# Patient Record
Sex: FEMALE | Race: WHITE | NOT HISPANIC OR LATINO | Employment: FULL TIME | URBAN - METROPOLITAN AREA
[De-identification: names, ages, dates, MRNs, and addresses within clinical notes are randomized per-mention and may not be internally consistent; named-entity substitution may affect disease eponyms.]

---

## 2017-04-06 ENCOUNTER — TRANSCRIBE ORDERS (OUTPATIENT)
Dept: LAB | Facility: CLINIC | Age: 22
End: 2017-04-06

## 2017-04-06 ENCOUNTER — APPOINTMENT (OUTPATIENT)
Dept: LAB | Facility: CLINIC | Age: 22
End: 2017-04-06
Payer: COMMERCIAL

## 2017-04-06 DIAGNOSIS — F31.81 BIPOLAR II DISORDER (HCC): ICD-10-CM

## 2017-04-06 DIAGNOSIS — F31.81 BIPOLAR II DISORDER (HCC): Primary | ICD-10-CM

## 2017-04-06 LAB
25(OH)D3 SERPL-MCNC: 13 NG/ML (ref 30–100)
ALBUMIN SERPL BCP-MCNC: 4 G/DL (ref 3.5–5)
ALP SERPL-CCNC: 77 U/L (ref 46–116)
ALT SERPL W P-5'-P-CCNC: 21 U/L (ref 12–78)
ANION GAP SERPL CALCULATED.3IONS-SCNC: 5 MMOL/L (ref 4–13)
AST SERPL W P-5'-P-CCNC: 7 U/L (ref 5–45)
BASOPHILS # BLD AUTO: 0.01 THOUSANDS/ΜL (ref 0–0.1)
BASOPHILS NFR BLD AUTO: 0 % (ref 0–1)
BILIRUB SERPL-MCNC: 0.33 MG/DL (ref 0.2–1)
BUN SERPL-MCNC: 15 MG/DL (ref 5–25)
CALCIUM SERPL-MCNC: 9 MG/DL (ref 8.3–10.1)
CHLORIDE SERPL-SCNC: 108 MMOL/L (ref 100–108)
CO2 SERPL-SCNC: 26 MMOL/L (ref 21–32)
CREAT SERPL-MCNC: 0.71 MG/DL (ref 0.6–1.3)
EOSINOPHIL # BLD AUTO: 0.16 THOUSAND/ΜL (ref 0–0.61)
EOSINOPHIL NFR BLD AUTO: 3 % (ref 0–6)
ERYTHROCYTE [DISTWIDTH] IN BLOOD BY AUTOMATED COUNT: 13.3 % (ref 11.6–15.1)
GFR SERPL CREATININE-BSD FRML MDRD: >60 ML/MIN/1.73SQ M
GLUCOSE P FAST SERPL-MCNC: 89 MG/DL (ref 65–99)
HCT VFR BLD AUTO: 37.7 % (ref 34.8–46.1)
HGB BLD-MCNC: 12.4 G/DL (ref 11.5–15.4)
LYMPHOCYTES # BLD AUTO: 2.18 THOUSANDS/ΜL (ref 0.6–4.47)
LYMPHOCYTES NFR BLD AUTO: 36 % (ref 14–44)
MCH RBC QN AUTO: 30 PG (ref 26.8–34.3)
MCHC RBC AUTO-ENTMCNC: 32.9 G/DL (ref 31.4–37.4)
MCV RBC AUTO: 91 FL (ref 82–98)
MONOCYTES # BLD AUTO: 0.33 THOUSAND/ΜL (ref 0.17–1.22)
MONOCYTES NFR BLD AUTO: 6 % (ref 4–12)
NEUTROPHILS # BLD AUTO: 3.36 THOUSANDS/ΜL (ref 1.85–7.62)
NEUTS SEG NFR BLD AUTO: 55 % (ref 43–75)
NRBC BLD AUTO-RTO: 0 /100 WBCS
PLATELET # BLD AUTO: 233 THOUSANDS/UL (ref 149–390)
PMV BLD AUTO: 10.9 FL (ref 8.9–12.7)
POTASSIUM SERPL-SCNC: 4.3 MMOL/L (ref 3.5–5.3)
PROT SERPL-MCNC: 6.9 G/DL (ref 6.4–8.2)
RBC # BLD AUTO: 4.13 MILLION/UL (ref 3.81–5.12)
SODIUM SERPL-SCNC: 139 MMOL/L (ref 136–145)
T3FREE SERPL-MCNC: 2.83 PG/ML (ref 2.91–4.53)
T4 FREE SERPL-MCNC: 0.95 NG/DL (ref 0.78–1.33)
TSH SERPL DL<=0.05 MIU/L-ACNC: 0.9 UIU/ML (ref 0.46–3.98)
WBC # BLD AUTO: 6.05 THOUSAND/UL (ref 4.31–10.16)

## 2017-04-06 PROCEDURE — 36415 COLL VENOUS BLD VENIPUNCTURE: CPT

## 2017-04-06 PROCEDURE — 82652 VIT D 1 25-DIHYDROXY: CPT

## 2017-04-06 PROCEDURE — 85025 COMPLETE CBC W/AUTO DIFF WBC: CPT

## 2017-04-06 PROCEDURE — 82306 VITAMIN D 25 HYDROXY: CPT

## 2017-04-06 PROCEDURE — 84481 FREE ASSAY (FT-3): CPT

## 2017-04-06 PROCEDURE — 84439 ASSAY OF FREE THYROXINE: CPT

## 2017-04-06 PROCEDURE — 84443 ASSAY THYROID STIM HORMONE: CPT

## 2017-04-06 PROCEDURE — 80053 COMPREHEN METABOLIC PANEL: CPT

## 2017-04-10 LAB — 1,25(OH)2D3 SERPL-MCNC: 31.2 PG/ML (ref 19.9–79.3)

## 2017-04-18 VITALS
HEART RATE: 73 BPM | TEMPERATURE: 98.5 F | WEIGHT: 115 LBS | SYSTOLIC BLOOD PRESSURE: 140 MMHG | RESPIRATION RATE: 20 BRPM | OXYGEN SATURATION: 100 % | DIASTOLIC BLOOD PRESSURE: 76 MMHG

## 2017-04-18 RX ORDER — GABAPENTIN 100 MG/1
100 CAPSULE ORAL 3 TIMES DAILY
COMMUNITY
End: 2019-04-01 | Stop reason: DRUGHIGH

## 2017-04-18 RX ORDER — DEXTROAMPHETAMINE SACCHARATE, AMPHETAMINE ASPARTATE, DEXTROAMPHETAMINE SULFATE AND AMPHETAMINE SULFATE 1.25; 1.25; 1.25; 1.25 MG/1; MG/1; MG/1; MG/1
10 TABLET ORAL 2 TIMES DAILY
COMMUNITY
End: 2019-04-01 | Stop reason: DRUGHIGH

## 2017-04-19 ENCOUNTER — HOSPITAL ENCOUNTER (EMERGENCY)
Facility: HOSPITAL | Age: 22
Discharge: HOME | End: 2017-04-19
Attending: EMERGENCY MEDICINE | Admitting: EMERGENCY MEDICINE

## 2017-04-19 ENCOUNTER — APPOINTMENT (EMERGENCY)
Dept: RADIOLOGY | Facility: HOSPITAL | Age: 22
End: 2017-04-19

## 2017-04-19 DIAGNOSIS — S61.419A HAND LACERATION: Primary | ICD-10-CM

## 2017-04-19 PROCEDURE — 99283 EMERGENCY DEPT VISIT LOW MDM: CPT

## 2017-04-19 PROCEDURE — 73130 X-RAY EXAM OF HAND: CPT

## 2017-04-19 RX ORDER — LIDOCAINE HYDROCHLORIDE 10 MG/ML
10 INJECTION, SOLUTION EPIDURAL; INFILTRATION; INTRACAUDAL; PERINEURAL ONCE
Status: COMPLETED | OUTPATIENT
Start: 2017-04-19 | End: 2017-04-19

## 2017-04-19 RX ADMIN — LIDOCAINE HYDROCHLORIDE 10 ML: 10 INJECTION, SOLUTION EPIDURAL; INFILTRATION; INTRACAUDAL; PERINEURAL at 01:07

## 2017-11-30 ENCOUNTER — HOSPITAL ENCOUNTER (EMERGENCY)
Facility: HOSPITAL | Age: 22
Discharge: HOME | End: 2017-12-01
Attending: EMERGENCY MEDICINE | Admitting: EMERGENCY MEDICINE
Payer: COMMERCIAL

## 2017-11-30 ENCOUNTER — APPOINTMENT (EMERGENCY)
Dept: RADIOLOGY | Facility: HOSPITAL | Age: 22
End: 2017-11-30
Payer: COMMERCIAL

## 2017-11-30 ENCOUNTER — GENERIC CONVERSION - ENCOUNTER (OUTPATIENT)
Dept: OTHER | Facility: OTHER | Age: 22
End: 2017-11-30

## 2017-11-30 VITALS
OXYGEN SATURATION: 100 % | RESPIRATION RATE: 18 BRPM | HEART RATE: 122 BPM | WEIGHT: 120 LBS | TEMPERATURE: 99.6 F | SYSTOLIC BLOOD PRESSURE: 136 MMHG | DIASTOLIC BLOOD PRESSURE: 82 MMHG

## 2017-11-30 DIAGNOSIS — R00.2 PALPITATIONS: ICD-10-CM

## 2017-11-30 DIAGNOSIS — E87.6 HYPOKALEMIA: ICD-10-CM

## 2017-11-30 DIAGNOSIS — R00.0 TACHYCARDIA: Primary | ICD-10-CM

## 2017-11-30 LAB
ANION GAP BLD CALC-SCNC: 17 MMOL/L (ref 4–13)
BASOPHILS # BLD AUTO: 0.01 THOUSANDS/ΜL (ref 0–0.1)
BASOPHILS NFR BLD AUTO: 0 % (ref 0–1)
BUN BLD-MCNC: 15 MG/DL (ref 5–25)
CA-I BLD-SCNC: 1.24 MMOL/L (ref 1.12–1.32)
CHLORIDE BLD-SCNC: 109 MMOL/L (ref 100–108)
CREAT BLD-MCNC: 0.8 MG/DL (ref 0.6–1.3)
DEPRECATED D DIMER PPP: <220 NG/ML (FEU) (ref 0–424)
EOSINOPHIL # BLD AUTO: 0.02 THOUSAND/ΜL (ref 0–0.61)
EOSINOPHIL NFR BLD AUTO: 0 % (ref 0–6)
ERYTHROCYTE [DISTWIDTH] IN BLOOD BY AUTOMATED COUNT: 12 % (ref 11.6–15.1)
EXT PREG TEST URINE: NEGATIVE
GFR SERPL CREATININE-BSD FRML MDRD: 106 ML/MIN/1.73SQ M
GLUCOSE SERPL-MCNC: 97 MG/DL (ref 65–140)
HCT VFR BLD AUTO: 41.2 % (ref 34.8–46.1)
HCT VFR BLD CALC: 42 % (ref 34.8–46.1)
HGB BLD-MCNC: 14.5 G/DL (ref 11.5–15.4)
HGB BLDA-MCNC: 14.3 G/DL (ref 11.5–15.4)
LYMPHOCYTES # BLD AUTO: 2.07 THOUSANDS/ΜL (ref 0.6–4.47)
LYMPHOCYTES NFR BLD AUTO: 17 % (ref 14–44)
MCH RBC QN AUTO: 32.3 PG (ref 26.8–34.3)
MCHC RBC AUTO-ENTMCNC: 35.2 G/DL (ref 31.4–37.4)
MCV RBC AUTO: 92 FL (ref 82–98)
MONOCYTES # BLD AUTO: 0.77 THOUSAND/ΜL (ref 0.17–1.22)
MONOCYTES NFR BLD AUTO: 6 % (ref 4–12)
NEUTROPHILS # BLD AUTO: 9.14 THOUSANDS/ΜL (ref 1.85–7.62)
NEUTS SEG NFR BLD AUTO: 77 % (ref 43–75)
NRBC BLD AUTO-RTO: 0 /100 WBCS
PCO2 BLD: 23 MMOL/L (ref 21–32)
PLATELET # BLD AUTO: 246 THOUSANDS/UL (ref 149–390)
PMV BLD AUTO: 10.3 FL (ref 8.9–12.7)
POTASSIUM BLD-SCNC: 3.6 MMOL/L (ref 3.5–5.3)
RBC # BLD AUTO: 4.49 MILLION/UL (ref 3.81–5.12)
SODIUM BLD-SCNC: 144 MMOL/L (ref 136–145)
SPECIMEN SOURCE: ABNORMAL
SPECIMEN SOURCE: NORMAL
TROPONIN I BLD-MCNC: 0 NG/ML (ref 0–0.08)
WBC # BLD AUTO: 12.03 THOUSAND/UL (ref 4.31–10.16)

## 2017-11-30 PROCEDURE — 80047 BASIC METABLC PNL IONIZED CA: CPT

## 2017-11-30 PROCEDURE — 83735 ASSAY OF MAGNESIUM: CPT | Performed by: EMERGENCY MEDICINE

## 2017-11-30 PROCEDURE — 96374 THER/PROPH/DIAG INJ IV PUSH: CPT

## 2017-11-30 PROCEDURE — 84443 ASSAY THYROID STIM HORMONE: CPT | Performed by: EMERGENCY MEDICINE

## 2017-11-30 PROCEDURE — 81025 URINE PREGNANCY TEST: CPT | Performed by: EMERGENCY MEDICINE

## 2017-11-30 PROCEDURE — 80053 COMPREHEN METABOLIC PANEL: CPT | Performed by: EMERGENCY MEDICINE

## 2017-11-30 PROCEDURE — 71020 HB CHEST X-RAY 2VW FRONTAL&LATL: CPT

## 2017-11-30 PROCEDURE — 84484 ASSAY OF TROPONIN QUANT: CPT

## 2017-11-30 PROCEDURE — 85379 FIBRIN DEGRADATION QUANT: CPT | Performed by: EMERGENCY MEDICINE

## 2017-11-30 PROCEDURE — 93005 ELECTROCARDIOGRAM TRACING: CPT | Performed by: EMERGENCY MEDICINE

## 2017-11-30 PROCEDURE — 85025 COMPLETE CBC W/AUTO DIFF WBC: CPT | Performed by: EMERGENCY MEDICINE

## 2017-11-30 PROCEDURE — 36415 COLL VENOUS BLD VENIPUNCTURE: CPT | Performed by: EMERGENCY MEDICINE

## 2017-11-30 PROCEDURE — 85014 HEMATOCRIT: CPT

## 2017-11-30 PROCEDURE — 96361 HYDRATE IV INFUSION ADD-ON: CPT

## 2017-11-30 RX ORDER — LORAZEPAM 2 MG/ML
0.5 INJECTION INTRAMUSCULAR ONCE
Status: COMPLETED | OUTPATIENT
Start: 2017-11-30 | End: 2017-11-30

## 2017-11-30 RX ORDER — MEDROXYPROGESTERONE ACETATE 150 MG/ML
150 INJECTION, SUSPENSION INTRAMUSCULAR
COMMUNITY
End: 2019-04-01 | Stop reason: DRUGHIGH

## 2017-11-30 RX ORDER — RISPERIDONE 0.5 MG/1
0.5 TABLET, FILM COATED ORAL
COMMUNITY
End: 2019-04-01 | Stop reason: DRUGHIGH

## 2017-11-30 RX ADMIN — SODIUM CHLORIDE 1000 ML: 0.9 INJECTION, SOLUTION INTRAVENOUS at 23:00

## 2017-11-30 RX ADMIN — LORAZEPAM 0.5 MG: 2 INJECTION INTRAMUSCULAR; INTRAVENOUS at 22:57

## 2017-12-01 LAB
ALBUMIN SERPL BCP-MCNC: 4.8 G/DL (ref 3.5–5)
ALP SERPL-CCNC: 85 U/L (ref 46–116)
ALT SERPL W P-5'-P-CCNC: 27 U/L (ref 12–78)
ANION GAP SERPL CALCULATED.3IONS-SCNC: 8 MMOL/L (ref 4–13)
AST SERPL W P-5'-P-CCNC: 17 U/L (ref 5–45)
BILIRUB SERPL-MCNC: 0.59 MG/DL (ref 0.2–1)
BUN SERPL-MCNC: 14 MG/DL (ref 5–25)
CALCIUM SERPL-MCNC: 9.4 MG/DL (ref 8.3–10.1)
CHLORIDE SERPL-SCNC: 110 MMOL/L (ref 100–108)
CO2 SERPL-SCNC: 25 MMOL/L (ref 21–32)
CREAT SERPL-MCNC: 0.9 MG/DL (ref 0.6–1.3)
GFR SERPL CREATININE-BSD FRML MDRD: 92 ML/MIN/1.73SQ M
GLUCOSE SERPL-MCNC: 94 MG/DL (ref 65–140)
MAGNESIUM SERPL-MCNC: 2.5 MG/DL (ref 1.6–2.6)
POTASSIUM SERPL-SCNC: 3.5 MMOL/L (ref 3.5–5.3)
PROT SERPL-MCNC: 8.1 G/DL (ref 6.4–8.2)
SODIUM SERPL-SCNC: 143 MMOL/L (ref 136–145)
TSH SERPL DL<=0.05 MIU/L-ACNC: 1.44 UIU/ML (ref 0.36–3.74)

## 2017-12-01 PROCEDURE — 99285 EMERGENCY DEPT VISIT HI MDM: CPT

## 2017-12-01 RX ORDER — POTASSIUM CHLORIDE 20 MEQ/1
20 TABLET, EXTENDED RELEASE ORAL 2 TIMES DAILY
Qty: 10 TABLET | Refills: 0 | Status: SHIPPED | OUTPATIENT
Start: 2017-12-01 | End: 2017-12-06

## 2017-12-01 RX ORDER — POTASSIUM CHLORIDE 20 MEQ/1
40 TABLET, EXTENDED RELEASE ORAL ONCE
Status: COMPLETED | OUTPATIENT
Start: 2017-12-01 | End: 2017-12-01

## 2017-12-01 RX ADMIN — POTASSIUM CHLORIDE 40 MEQ: 1500 TABLET, EXTENDED RELEASE ORAL at 00:30

## 2017-12-01 NOTE — ED ATTENDING ATTESTATION
Patricia Nieto MD, saw and evaluated the patient  I have discussed the patient with the resident/non-physician practitioner and agree with the resident's/non-physician practitioner's findings, Plan of Care, and MDM as documented in the resident's/non-physician practitioner's note, except where noted  All available labs and Radiology studies were reviewed  At this point I agree with the current assessment done in the Emergency Department  I have conducted an independent evaluation of this patient a history and physical is as follows:      Critical Care Time  CritCare Time    24 yo female c/o palpitations with sob intermittently faster at times started two hours ago  Pt with hx of anxiety and depression  Pt with no abdominal pain, no headache, no n/v  Pt with lightheadedness  No hx of same  Vss, afebrile, tachy, lungs cta, rrr, abdomen soft nontender, no pedal edema, no neuro deficits  Labs, tsh, urine preg, cxr, ddimer, ivf, ativan

## 2017-12-01 NOTE — ED NOTES
Dr Terrie Marshall at the bedside with d/c instructions at this time     Chapo Baxter, RN  12/01/17 0028

## 2017-12-01 NOTE — DISCHARGE INSTRUCTIONS
Heart Palpitations   WHAT YOU NEED TO KNOW:   Heart palpitations are feelings that your heart races, jumps, throbs, or flutters  You may feel extra beats, no beats for a short time, or skipped beats  You may have these feelings in your chest, throat, or neck  They may happen when you are sitting, standing, or lying  Heart palpitations may be frightening, but are usually not caused by a serious problem  DISCHARGE INSTRUCTIONS:   Call 911 or have someone else call for any of the following:   · You have any of the following signs of a heart attack:      ¨ Squeezing, pressure, or pain in your chest that lasts longer than 5 minutes or returns    ¨ Discomfort or pain in your back, neck, jaw, stomach, or arm     ¨ Trouble breathing    ¨ Nausea or vomiting    ¨ Lightheadedness or a sudden cold sweat, especially with chest pain or trouble breathing    · You have any of the following signs of a stroke:      ¨ Numbness or drooping on one side of your face     ¨ Weakness in an arm or leg    ¨ Confusion or difficulty speaking    ¨ Dizziness, a severe headache, or vision loss    · You faint or lose consciousness  Return to the emergency department if:   · Your palpitations happen more often or get more intense  Contact your healthcare provider if:   · You have new or worsening swelling in your feet or ankles  · You have questions or concerns about your condition or care  Follow up with your healthcare provider as directed: You may need to follow up with a cardiologist  Virgene Back may need tests to check for heart problems that cause palpitations  Write down your questions so you remember to ask them during your visits  Keep a record:  Write down when your palpitations start and stop, what you were doing when they started, and your symptoms  Keep track of what you ate or drank within a few hours of your palpitations  Include anything that seemed to help your symptoms, such as lying down or holding your breath   This record will help you and your healthcare provider learn what triggers your palpitations  Bring this record with you to your follow up visits  Help prevent heart palpitations:   · Manage stress and anxiety  Find ways to relax such as listening to music, meditating, or doing yoga  Exercise can also help decrease stress and anxiety  Talk to someone you trust about your stress or anxiety  You can also talk to a therapist      · Get plenty of sleep every night  Ask your healthcare provider how much sleep you need each night  · Do not drink caffeine or alcohol  Caffeine and alcohol can make your palpitations worse  Caffeine is found in soda, coffee, tea, chocolate, and drinks that increase your energy  · Do not smoke  Nicotine and other chemicals in cigarettes and cigars may damage your heart and blood vessels  Ask your healthcare provider for information if you currently smoke and need help to quit  E-cigarettes or smokeless tobacco still contain nicotine  Talk to your healthcare provider before you use these products  · Do not use illegal drugs  Talk to your healthcare provider if you use illegal drugs and want help to quit  © 2017 [address] [facility] Information is for End User's use only and may not be sold, redistributed or otherwise used for commercial purposes  All illustrations and images included in CareNotes® are the copyrighted property of A D A M , Inc  or Lavon Darnell  The above information is an  only  It is not intended as medical advice for individual conditions or treatments  Talk to your doctor, nurse or pharmacist before following any medical regimen to see if it is safe and effective for you  Tachycardia   WHAT YOU NEED TO KNOW:   Tachycardia (fast heart rate) is when your heart rate is 100 beats per minute or more at rest  It is normal for the heart rate to increase with activity or exercise and then decrease when you stop   A fast heart rate at rest may be caused by any of the following:  · Anxiety, stress, or pain    · Fever    · Physical fatigue or strenuous exercise    · Large amounts of caffeine such as coffee, tea, and energy drinks    · Heavy alcohol use or cigarette smoking    · Some medicines, inhalers, or street drugs    · Increased thyroid hormone level  DISCHARGE INSTRUCTIONS:   Call 911 or have someone else call for any of the following:   · You have any of the following signs of a heart attack:     ¨ Squeezing, pressure, or pain in your chest that lasts longer than a few minutes  Chest pain may come and go  ¨ Pain in your jaw, neck, one or both arms, upper and lower back, or stomach  ¨ Shortness of breath, or panting    ¨ Nausea or vomiting    ¨ Lightheadedness    · You cannot be woken  Contact your healthcare provider if:   · Your pulse is faster than your healthcare provider said it should be  · You have frequent periods of a fast heart rate  · You feel weak or dizzy  · You have questions or concerns about your condition or care  Help prevent a fast heart rate:  · Decrease the amount of caffeine you drink  Caffeine can increase your heart rate  · Limit or do not drink alcohol  Alcohol can increase your heart rate  Ask your healthcare provider if it is safe for you to drink alcohol  Also ask how much is safe for you to drink  · Do not smoke  Nicotine and other chemicals in cigarettes can cause damage to your heart  Ask your healthcare provider for information if you currently smoke and need help to quit  E-cigarettes or smokeless tobacco still contain nicotine  Talk to your healthcare provider before you use these products  · Do not use illegal drugs  Drugs such as meth and cocaine can increase your heart rate  Talk to your healthcare provider if you use illegal drugs and want to quit  · Get more rest   Fatigue can cause your heart rate to increase   Ask your healthcare provider about the best exercise plan for you     · Learn ways to cope with stress  Stress, fear, and anxiety can cause a fast heart rate  Your healthcare provider may recommend relaxation techniques and deep breathing exercises  Your healthcare provider may recommend you talk to someone about your stress or anxiety, such as a counselor or a trusted friend  Check your pulse as directed: Your healthcare provider will show you how to check your pulse, and how often to check it  Write down how fast your pulse is and if it feels regular or like it is skipping beats  Also write down the activity you were doing if your heart rate is above 100  Bring the information with you to your follow-up appointment  Follow up with your healthcare provider as directed: You may need more tests  Write down your questions so you remember to ask them at your visit  © 2017 [address] [facility] Information is for End User's use only and may not be sold, redistributed or otherwise used for commercial purposes  All illustrations and images included in CareNotes® are the copyrighted property of A D A M , Inc  or Lavon Patrick  The above information is an  only  It is not intended as medical advice for individual conditions or treatments  Talk to your doctor, nurse or pharmacist before following any medical regimen to see if it is safe and effective for you

## 2017-12-01 NOTE — ED PROVIDER NOTES
History  Chief Complaint   Patient presents with    Palpitations     Pt states that she was at work and her heart started racing and she felt like she was going to pass out  This is a 25 y o  old female who presents to the ED for evaluation of palpitations  Over the last 2 hours, the patient has noted waxing and waning tachycardia  Feels palpitations  She does feel mildly short of breath when this happens  Patient has never had any of this happen before  No history of cardiac disease  Patient does not take estrogen containing hormones  No recent travels  No personal history of venous thromboembolism  No history of thyroid disease  Status a history of thyroid cancer which was surgically intervene on many years ago  Patient does not report episodes of sweating  She does not feel nauseous right now  She does have a history of anxiety her does not feel anxious  She is an everyday marijuana smoker  Denies any other drug use  No hair loss, appetite changes  Prior to Admission Medications   Prescriptions Last Dose Informant Patient Reported? Taking? MELATONIN PO   Yes Yes   Sig: Take by mouth   amphetamine-dextroamphetamine (ADDERALL) 5 MG tablet   Yes No   Sig: Take 10 mg by mouth 2 (two) times a day     gabapentin (NEURONTIN) 100 mg capsule   Yes Yes   Sig: Take 100 mg by mouth 3 (three) times a day   medroxyPROGESTERone (DEPO-PROVERA) 150 mg/mL injection   Yes Yes   Sig: Inject 150 mg into the shoulder, thigh, or buttocks every 3 (three) months   risperiDONE (RisperDAL) 0 5 mg tablet   Yes Yes   Sig: Take 0 5 mg by mouth daily at bedtime      Facility-Administered Medications: None     Past Medical History:   Diagnosis Date    Anxiety     Anxiety      Past Surgical History:   Procedure Laterality Date    APPENDECTOMY       History reviewed  No pertinent family history  I have reviewed and agree with the history as documented      Social History   Substance Use Topics    Smoking status: Current Every Day Smoker     Packs/day: 0 25     Types: Cigarettes    Smokeless tobacco: Never Used    Alcohol use Yes      Comment: rarely      Review of Systems   Constitutional: Negative for chills, fatigue, fever and unexpected weight change  HENT: Negative for congestion, rhinorrhea and sore throat  Eyes: Negative for redness and visual disturbance  Respiratory: Negative for cough and shortness of breath  Cardiovascular: Positive for palpitations  Negative for chest pain and leg swelling  Gastrointestinal: Negative for abdominal pain, constipation, diarrhea, nausea and vomiting  Endocrine: Negative for cold intolerance and heat intolerance  Genitourinary: Negative for dysuria, frequency and urgency  Musculoskeletal: Negative for back pain  Skin: Negative for rash  Neurological: Negative for dizziness, syncope and numbness  All other systems reviewed and are negative  Physical Exam  ED Triage Vitals [11/30/17 2242]   Temperature Pulse Respirations Blood Pressure SpO2   99 6 °F (37 6 °C) (!) 164 18 144/91 100 %      Temp Source Heart Rate Source Patient Position - Orthostatic VS BP Location FiO2 (%)   Tympanic Monitor Sitting Left arm --      Pain Score       No Pain         Physical Exam   Constitutional: She is oriented to person, place, and time  She appears well-developed and well-nourished  No distress  HENT:   Head: Normocephalic and atraumatic  Nose: Nose normal    Mouth/Throat: No oropharyngeal exudate  Eyes: Conjunctivae and EOM are normal  Pupils are equal, round, and reactive to light  Neck: Normal range of motion  Neck supple  Cardiovascular: Regular rhythm and normal heart sounds  Tachycardia present  Exam reveals no gallop  No murmur heard  Pulmonary/Chest: Effort normal and breath sounds normal  She has no wheezes  She exhibits no tenderness  Abdominal: Soft  Bowel sounds are normal  She exhibits no distension  There is no tenderness   There is no rebound and no guarding  Musculoskeletal: Normal range of motion  She exhibits no tenderness or deformity  Lymphadenopathy:     She has no cervical adenopathy  Neurological: She is alert and oriented to person, place, and time  No cranial nerve deficit  Skin: Skin is warm and dry  No rash noted  She is not diaphoretic  No erythema  Psychiatric: She has a normal mood and affect  Nursing note and vitals reviewed  ED Medications  Medications   LORazepam (ATIVAN) 2 mg/mL injection 0 5 mg (0 5 mg Intravenous Given 11/30/17 2257)   sodium chloride 0 9 % bolus 1,000 mL (0 mL Intravenous Stopped 12/1/17 0017)   potassium chloride (K-DUR,KLOR-CON) CR tablet 40 mEq (40 mEq Oral Given 12/1/17 0030)     Diagnostic Studies  Results Reviewed     Procedure Component Value Units Date/Time    Comprehensive metabolic panel [[id number]]  (Abnormal) Collected:  11/30/17 2259    Lab Status:  Final result Specimen:  Blood from Arm, Left Updated:  12/01/17 0002     Sodium 143 mmol/L      Potassium 3 5 mmol/L      Chloride 110 (H) mmol/L      CO2 25 mmol/L      Anion Gap 8 mmol/L      BUN 14 mg/dL      Creatinine 0 90 mg/dL      Glucose 94 mg/dL      Calcium 9 4 mg/dL      AST 17 U/L      ALT 27 U/L      Alkaline Phosphatase 85 U/L      Total Protein 8 1 g/dL      Albumin 4 8 g/dL      Total Bilirubin 0 59 mg/dL      eGFR 92 ml/min/1 73sq m     Narrative:         National Kidney Disease Education Program recommendations are as follows:  GFR calculation is accurate only with a steady state creatinine  Chronic Kidney disease less than 60 ml/min/1 73 sq  meters  Kidney failure less than 15 ml/min/1 73 sq  meters      TSH, 3rd generation with T4 reflex [[id number]]  (Normal) Collected:  11/30/17 2259    Lab Status:  Final result Specimen:  Blood from Arm, Left Updated:  12/01/17 0002     TSH 3RD GENERATON 1 440 uIU/mL     Narrative:         Patients undergoing fluorescein dye angiography may retain small amounts of fluorescein in the body for 48-72 hours post procedure  Samples containing fluorescein can produce falsely depressed TSH values  If the patient had this procedure,a specimen should be resubmitted post fluorescein clearance            The recommended reference ranges for TSH during pregnancy are as follows:  First trimester 0 1 to 2 5 uIU/mL  Second trimester  0 2 to 3 0 uIU/mL  Third trimester 0 3 to 3 0 uIU/m      Magnesium [[id number]]  (Normal) Collected:  11/30/17 2259    Lab Status:  Final result Specimen:  Blood from Arm, Left Updated:  12/01/17 0002     Magnesium 2 5 mg/dL     D-dimer, quantitative [[id number]]  (Normal) Collected:  11/30/17 2258    Lab Status:  Final result Specimen:  Blood from Arm, Left Updated:  11/30/17 2338     D-Dimer, Quant <220 ng/ml (FEU)     POCT pregnancy, urine [[id number]]  (Normal) Resulted:  11/30/17 2330    Lab Status:  Final result Updated:  11/30/17 2330     EXT PREG TEST UR (Ref: Negative) Negative    CBC and differential [[id number]]  (Abnormal) Collected:  11/30/17 2259    Lab Status:  Final result Specimen:  Blood from Arm, Left Updated:  11/30/17 2312     WBC 12 03 (H) Thousand/uL      RBC 4 49 Million/uL      Hemoglobin 14 5 g/dL      Hematocrit 41 2 %      MCV 92 fL      MCH 32 3 pg      MCHC 35 2 g/dL      RDW 12 0 %      MPV 10 3 fL      Platelets 595 Thousands/uL      nRBC 0 /100 WBCs      Neutrophils Relative 77 (H) %      Lymphocytes Relative 17 %      Monocytes Relative 6 %      Eosinophils Relative 0 %      Basophils Relative 0 %      Neutrophils Absolute 9 14 (H) Thousands/µL      Lymphocytes Absolute 2 07 Thousands/µL      Monocytes Absolute 0 77 Thousand/µL      Eosinophils Absolute 0 02 Thousand/µL      Basophils Absolute 0 01 Thousands/µL     POCT Chem 8+ [[id number]]  (Abnormal) Collected:  11/30/17 2257    Lab Status:  Final result Updated:  11/30/17 2302     SODIUM, I-STAT 144 mmol/l      Potassium, i-STAT 3 6 mmol/L      Chloride, istat 109 (H) mmol/L      CO2, i-STAT 23 mmol/L Anion Gap, Istat 17 (H) mmol/L      Calcium, Ionized i-STAT 1 24 mmol/L      BUN, I-STAT 15 mg/dl      Creatinine, i-STAT 0 8 mg/dl      eGFR 106 ml/min/1 73sq m      Glucose, i-STAT 97 mg/dl      Hct, i-STAT 42 %      Hgb, i-STAT 14 3 g/dl      Specimen Type VENOUS    POCT troponin [[id number]]  (Normal) Collected:  11/30/17 2248    Lab Status:  Final result Updated:  11/30/17 2302     POC Troponin I 0 00 ng/ml      Specimen Type VENOUS    Narrative:         [employer] i-Stat handheld analyzer 99% cutoff is > 0 08ng/mL in network Emergency Departments    o cTnI 99% cutoff is useful only when applied to patients in the clinical setting of myocardial ischemia  o cTnI 99% cutoff should be interpreted in the context of clinical history, ECG findings and possibly cardiac imaging to establish correct diagnosis  o cTnI 99% cutoff may be suggestive but clearly not indicative of a coronary event without the clinical setting of myocardial ischemia  X-ray chest 2 views   ED Interpretation by Itzel Pena MD (12/01 0003)   Chest xray shows no evidence of focal consolidation, pleural effusion, pneumothorax, or other acute pulmonary pathology as interpreted by me  Procedures  ECG 12 Lead Documentation  Date/Time: 11/30/2017 10:50 PM  Performed by: Rosemary Vazquez  Authorized by: Taylor Meléndez     Indications / Diagnosis:  Tachycardia  ECG reviewed by me, the ED Provider: yes    Patient location:  ED  Comments:      Normal sinus rhythm, rate 145, normal intervals, rightward axis, RSR prime in V1, S1Q3T3 noted  No acute ST segment changes  No previous EKG is available  ECG 12 Lead Documentation  Date/Time: 11/30/2017 10:51 PM  Performed by: Rosemary Vazquez  Authorized by: Taylor Meléndez     Indications / Diagnosis:  Tachycardia  ECG reviewed by me, the ED Provider: yes    Patient location:  ED  Comments:      Sinus tachycardia, rate 109, rightward axis, normal intervals, nonspecific ST segment changes    When compared to previous EKG dated today 1 minutes earlier, the rate has decreased  Phone Consults  ED Phone Contact    ED Course    A/P: This is a 25 y o  female who presents to the ED for evaluation of palpitations  Noted to be quite tachycardic on evaluation  DDx includes thyroid, electrolyte disturbance, PE, pregnancy  Will check labs, EKG, CXR, UPreg  Will get a D-Dimer, as low risk by wells, but cannot exclude by perc  Wells Score - PE  (0) Clinical signs and symptoms of DVT (+3)  (3) PE is #1 diagnosis, or equally likely (+3)  (1 5) Heart rate greater than 100 (+1 5)  (0) Immobilization at least 3 days, or surgery in the past 4 weeks (+1 5)  (0) Previous objectively diagnosed PE or DVT (+1 5)  (0) Hemoptysis (+1)  (0) Malignancy with treatment within 6 months or palliative (+1)  Today's Score: 4 5  Scoring: >6: High Probability, 2-6: Moderate Probability, <2: Low Probability    PERC Rule for PE    Flowsheet Row Most Recent Value   PERC Rule for PE   Age >=50  0 Filed at: 11/30/2017 2332   HR >=100  1 Filed at: 11/30/2017 2332   O2 Sat on room air < 95%  0 Filed at: 11/30/2017 2332   History of PE or DVT  0 Filed at: 11/30/2017 2332   Recent trauma or surgery  0 Filed at: 11/30/2017 2332   Hemoptysis  0 Filed at: 11/30/2017 2332   Exogenous estrogen  0 Filed at: 11/30/2017 2332   Unilateral leg swelling  0 Filed at: 11/30/2017 2332   [address]  Rule for PE Results  1 Filed at: 11/30/2017 2332        0030 Labs reviewed  D-dimer is negative, therefore can't exclude pulmonary embolism from the differential diagnosis  Patient's potassium was 3 5, we will replete to greater than 4  Will discharge with a short course of potassium  Etiology tachycardia remains unclear  This could be secondary to the patient's marijuana use if it was laced with an anticholinergic substance would explain these findings  She does have normal pupils, normal mucous membranes, and is not hallucinating, therefore I find this unlikely  Inappropriate sinus tachycardia remains on the differential   Intermittent SVT versus atrial ectopic rhythms also remain on the differential   We will discharge this patient home to follow up with primary care doctor for Holter monitor and the number for electrophysiology was brought into the patient as well should she desire to follow with the specialist however I encouraged her to start with her primary care doctor 1st   Return precautions discussed  Patient and family acknowledge receipt of same  We will discharge this patient home with primary care follow-up  Patient is in agreement with this plan as outlined above  MDM  CritCare Time    Disposition  Final diagnoses:   Tachycardia   Palpitations   Hypokalemia     Time reflects when diagnosis was documented in both MDM as applicable and the Disposition within this note     Time User Action Codes Description Comment    12/1/2017 12:21 AM Laura Anes Add [R00 0] Tachycardia     12/1/2017 12:21 AM Laura Anes Add [R00 2] Palpitations     12/1/2017 12:21 AM Carson Anes Add [E87 6] Hypokalemia       ED Disposition     ED Disposition Condition Comment    Discharge  Lynette Martel discharge to home/self care      Condition at discharge: Stable        Follow-up Information     Follow up With Specialties Details Why Skylar [id number], DO Cardiology Schedule an appointment as soon as possible for a visit in 1 week For evaluation of tachycardia [address] [place] [address]      Jacky Cai MD Family Medicine Schedule an appointment as soon as possible for a visit in 1 day For prescription for holter monitor [address] [place] [address]          Patient's Medications   Discharge Prescriptions    POTASSIUM CHLORIDE (K-DUR,KLOR-CON) 20 MEQ TABLET    Take 1 tablet by mouth 2 (two) times a day for 5 days       Start Date: 12/1/2017 End Date: 12/6/2017       Order Dose: 20 mEq       Quantity: 10 tablet    Refills: 0     No discharge procedures on file  ED Provider  Attending physically available and evaluated Lynette Whitaker I managed the patient along with the ED Attending      Electronically Signed by         Tate Fagan MD  Resident  12/01/17 6798

## 2017-12-01 NOTE — ED NOTES
Pt  Assisted to restroom at this time  No reports of dizziness or SOB  Steady gait noted        Nadine Her, RN  12/01/17 3909

## 2017-12-04 ENCOUNTER — TRANSCRIBE ORDERS (OUTPATIENT)
Dept: ADMINISTRATIVE | Facility: HOSPITAL | Age: 22
End: 2017-12-04

## 2017-12-04 DIAGNOSIS — R00.0 TACHYCARDIA: Primary | ICD-10-CM

## 2017-12-07 ENCOUNTER — ALLSCRIPTS OFFICE VISIT (OUTPATIENT)
Dept: OTHER | Facility: OTHER | Age: 22
End: 2017-12-07

## 2017-12-12 ENCOUNTER — HOSPITAL ENCOUNTER (OUTPATIENT)
Dept: NON INVASIVE DIAGNOSTICS | Facility: HOSPITAL | Age: 22
Discharge: HOME | End: 2017-12-12
Payer: COMMERCIAL

## 2017-12-12 ENCOUNTER — GENERIC CONVERSION - ENCOUNTER (OUTPATIENT)
Dept: OTHER | Facility: OTHER | Age: 22
End: 2017-12-12

## 2017-12-12 DIAGNOSIS — R00.0 TACHYCARDIA: ICD-10-CM

## 2017-12-12 PROCEDURE — 93306 TTE W/DOPPLER COMPLETE: CPT

## 2017-12-12 PROCEDURE — 93225 XTRNL ECG REC<48 HRS REC: CPT

## 2017-12-12 PROCEDURE — 93226 XTRNL ECG REC<48 HR SCAN A/R: CPT

## 2017-12-17 NOTE — CONSULTS
Assessment  1  Tachycardia (785 0) (R00 0)  2  Intermittent palpitations (785 1) (R00 2)    Plan  Tachycardia    · EKG/ECG- POC; Status:Complete;   Done: [id number]  Perform: In Office; [phone]; Last Updated By:Renee Manuel; 12/7/2017 3:17:40 PM;Ordered; For:Tachycardia; Ordered By:Cesar Drummond; Discussion/Summary    1  Palpitations - Patient was noted to be in Sinus tachycardia at that time  Could be secondary to marijuana abuse  Also patient takes Adderall which can also cause sinus tachycardia - f/u Echo to evaluate LV function and valvular abnormalities  - F/u Holter to r/o any arrhythmias  - Advised patient to be adequately hydrated  Chief Complaint  Patient here for consult for ER visit for tachycardia  States feels palpitation and gets lightheaded/dizzy, some episodes of presyncope but no true syncopal episodes  History of Present Illness  Cardiology HPI Free Text Note Form [facility]: 26 y/o F w/ PMHx of Bipolar disorder, Anxiety, ADHD was sent for evaluation for palpitations by ED  Last week she went to the ED as she c/o palpitations a/w dizziness for 3 hours  She was working at [employer] and she suddenly felt dizzy with palpitations  Her friend checked her heart rate and it was in the 160s to 180s, so she went to the ED  In the ED she was in Sinus tachycardia with HR in 140s  Workup in the ED was negative  TSH and D-dimer was normal  She takes Adderall since 4/2017 for ADHD  She was started on Toprol XL 25 mg by her family medicine doctor after her ED visit  She smokes a lot of marijuana daily  She drinks alcohol occasionally  She smokes atleast 6 cigarettes daily  She tries to drink 1-2 L of water daily  She works as a   Review of Systems     Cardiac: as noted in HPI  Psychological: as noted in HPI  General: as noted in HPI  Respiratory: as noted in HPI  Neurological: as noted in HPI     ROS reviewed  Active Problems  1  Acute sinusitis (461 9) (J01 90)  2   Anxiety (300 00) (F41 9)  3  Bipolar mood disorder (296 80) (F31 9)  4  Laceration w foreign body of finger w damage to nail, subs (883 1)  5  No pertinent past medical history  6  Pink eye, right (372 03) (H10 021)  7  Social anxiety disorder (300 23) (F40 10)  8  Tachycardia (785 0) (R00 0)    Past Medical History   · History of Appendicitis, acute (540 9) (K35 80)    The active problems and past medical history were reviewed and updated today  Surgical History   · History of Appendectomy    The surgical history was reviewed and updated today  Family History  Mother    · Family history of Arthritis   · Family history of Fibromyalgia   · Family history of HTN (hypertension)  Father    · Family history of Asthma   · Family history of HTN (hypertension)  Sibling    · Family history of Asthma  Family History Reviewed: The family history was reviewed and updated today  Social History   · Heavy tobacco smoker (305 1) (F17 200)  The social history was reviewed and updated today  Current Meds  1  Adderall 10 MG Oral Tablet; TAKE 1 TABLET TWICE DAILY; Therapy: 11EGH6010 to Recorded  2  Depo-Provera 150 MG/ML Intramuscular Suspension; Therapy: (Recorded:67Qkd1890) to Recorded  3  Melatonin 5 MG Oral Capsule; Therapy: (Recorded:00Sym9739) to Recorded  4  Metoprolol Succinate ER 25 MG Oral Tablet Extended Release 24 Hour; TAKE 1 TABLET DAILY; Therapy: 37NJK0667 to (Evaluate:72Pjw3438) Recorded  5  Neurontin 100 MG Oral Capsule; Therapy: (Recorded:34Stw2909) to Recorded  6  RisperiDONE 0 5 MG Oral Tablet; TAKE 1 TABLET AT BEDTIME; Therapy: 35BWH5870 to Recorded  7  Vitamin D3 94678 UNIT Oral Capsule; Therapy: 28FYD7821 to Recorded    The medication list was reviewed and updated today  Allergies  1  No Known Drug Allergies  2  No Known Environmental Allergies  3   No Known Food Allergies    Vitals  Signs   Heart Rate: 79  Systolic: 453, LUE, Sitting  Diastolic: 60, LUE, Sitting  Height: 5 ft 7 5 in  Weight: 122 lb 1 oz  BMI Calculated: 18 84  BSA Calculated: 1 65    Physical Exam   Constitutional Very Anxious looking  Eyes  Conjunctiva and Sclera examination: Conjunctiva pink, sclera anicteric  Neck  Neck and thyroid: Normal, supple, trachea midline, no thyromegaly  Pulmonary  Respiratory effort: No increased work of breathing or signs of respiratory distress  Auscultation of lungs: Clear to auscultation, no rales, no rhonchi, no wheezing, good air movement  Cardiovascular  Palpation of heart: Normal PMI, no thrills  Auscultation of heart: Normal rate and rhythm, normal S1 and S2, no murmurs  Chest - Chest: Normal   Abdomen  Abdomen: Non-tender and no distention  Musculoskeletal Gait and station: Normal gait  Skin - Skin and subcutaneous tissue: Normal without rashes or lesions  Skin is warm and well perfused, normal turgor  Psychiatric - Orientation to person, place, and time: Normal       End of Encounter Meds  1  Depo-Provera 150 MG/ML Intramuscular Suspension (MedroxyPROGESTERone Acetate); Therapy: (Recorded:16Gkx9315) to Recorded  2  Metoprolol Succinate ER 25 MG Oral Tablet Extended Release 24 Hour; TAKE 1 TABLET DAILY; Therapy: 81ZGE8096 to (Evaluate:05Jun2018) Recorded  3  Adderall 10 MG Oral Tablet (Amphetamine-Dextroamphetamine); TAKE 1 TABLET TWICE DAILY; Therapy: 05LQL4939 to Recorded  4  Melatonin 5 MG Oral Capsule; Therapy: (Recorded:89Wle2564) to Recorded  5  Neurontin 100 MG Oral Capsule (Gabapentin); Therapy: (Recorded:34Iaq0228) to Recorded  6  RisperiDONE 0 5 MG Oral Tablet; TAKE 1 TABLET AT BEDTIME; Therapy: 76ZCH1567 to Recorded  7  Vitamin D3 09997 UNIT Oral Capsule;  Therapy: 23PHG4653 to Recorded    Attending Note  i have seen, examined and evaluated the patient  i agree with assessment and plan of patient   normal cardiac examination   will obtain echo to look at cardiac structure Holter to look at cardiac rhythm      Signatures   Electronically signed by : Devika Narvaez MD; Dec  7 2017  6:36PM EST                       (Author)    Electronically signed by : MAI Goldberg ; Dec 16 2017  4:32PM EST                       (Author)

## 2018-01-22 VITALS
WEIGHT: 122.06 LBS | HEIGHT: 68 IN | BODY MASS INDEX: 18.5 KG/M2 | HEART RATE: 79 BPM | DIASTOLIC BLOOD PRESSURE: 60 MMHG | SYSTOLIC BLOOD PRESSURE: 110 MMHG

## 2019-04-01 ENCOUNTER — OFFICE VISIT (OUTPATIENT)
Dept: OBGYN CLINIC | Facility: CLINIC | Age: 24
End: 2019-04-01
Payer: COMMERCIAL

## 2019-04-01 VITALS — SYSTOLIC BLOOD PRESSURE: 100 MMHG | DIASTOLIC BLOOD PRESSURE: 70 MMHG

## 2019-04-01 DIAGNOSIS — Z30.019 ENCOUNTER FOR FEMALE BIRTH CONTROL: Primary | ICD-10-CM

## 2019-04-01 PROCEDURE — 96372 THER/PROPH/DIAG INJ SC/IM: CPT

## 2019-04-01 RX ORDER — METOPROLOL SUCCINATE 25 MG/1
1 TABLET, EXTENDED RELEASE ORAL DAILY
COMMUNITY
Start: 2017-12-07 | End: 2020-02-12 | Stop reason: ALTCHOICE

## 2019-04-01 RX ORDER — MEDROXYPROGESTERONE ACETATE 150 MG/ML
150 INJECTION, SUSPENSION INTRAMUSCULAR ONCE
Status: COMPLETED | OUTPATIENT
Start: 2019-04-01 | End: 2019-04-01

## 2019-04-01 RX ORDER — RISPERIDONE 3 MG/1
3 TABLET, FILM COATED ORAL
Refills: 0 | COMMUNITY
Start: 2019-01-11

## 2019-04-01 RX ORDER — CHOLECALCIFEROL (VITAMIN D3) 1250 MCG
CAPSULE ORAL
COMMUNITY
Start: 2017-12-07

## 2019-04-01 RX ORDER — DEXTROAMPHETAMINE SACCHARATE, AMPHETAMINE ASPARTATE, DEXTROAMPHETAMINE SULFATE AND AMPHETAMINE SULFATE 3.75; 3.75; 3.75; 3.75 MG/1; MG/1; MG/1; MG/1
25 TABLET ORAL 2 TIMES DAILY
Refills: 0 | COMMUNITY
Start: 2019-03-18

## 2019-04-01 RX ORDER — MEDROXYPROGESTERONE ACETATE 150 MG/ML
INJECTION, SUSPENSION INTRAMUSCULAR
Refills: 3 | COMMUNITY
Start: 2018-12-27 | End: 2019-06-18 | Stop reason: SDUPTHER

## 2019-04-01 RX ORDER — GABAPENTIN 300 MG/1
600 CAPSULE ORAL
Refills: 0 | COMMUNITY
Start: 2019-01-22

## 2019-04-01 RX ORDER — ESCITALOPRAM OXALATE 5 MG/1
20 TABLET ORAL DAILY
Refills: 0 | COMMUNITY
Start: 2019-01-14

## 2019-04-01 RX ADMIN — MEDROXYPROGESTERONE ACETATE 150 MG: 150 INJECTION, SUSPENSION INTRAMUSCULAR at 10:19

## 2019-06-18 DIAGNOSIS — Z30.42 ENCOUNTER FOR SURVEILLANCE OF INJECTABLE CONTRACEPTIVE: Primary | ICD-10-CM

## 2019-06-18 RX ORDER — MEDROXYPROGESTERONE ACETATE 150 MG/ML
INJECTION, SUSPENSION INTRAMUSCULAR
Qty: 1 ML | Refills: 0 | Status: SHIPPED | OUTPATIENT
Start: 2019-06-18 | End: 2020-02-12 | Stop reason: ALTCHOICE

## 2019-06-19 ENCOUNTER — CLINICAL SUPPORT (OUTPATIENT)
Dept: OBGYN CLINIC | Facility: CLINIC | Age: 24
End: 2019-06-19
Payer: COMMERCIAL

## 2019-06-19 VITALS
WEIGHT: 145 LBS | BODY MASS INDEX: 21.48 KG/M2 | HEIGHT: 69 IN | SYSTOLIC BLOOD PRESSURE: 108 MMHG | DIASTOLIC BLOOD PRESSURE: 70 MMHG

## 2019-06-19 DIAGNOSIS — IMO0001 BIRTH CONTROL: Primary | ICD-10-CM

## 2019-06-19 PROCEDURE — 96372 THER/PROPH/DIAG INJ SC/IM: CPT

## 2019-06-19 RX ORDER — MEDROXYPROGESTERONE ACETATE 150 MG/ML
150 INJECTION, SUSPENSION INTRAMUSCULAR ONCE
Status: COMPLETED | OUTPATIENT
Start: 2019-06-19 | End: 2019-06-19

## 2019-06-19 RX ADMIN — MEDROXYPROGESTERONE ACETATE 150 MG: 150 INJECTION, SUSPENSION INTRAMUSCULAR at 10:47

## 2019-07-17 ENCOUNTER — ANNUAL EXAM (OUTPATIENT)
Dept: OBGYN CLINIC | Facility: CLINIC | Age: 24
End: 2019-07-17
Payer: COMMERCIAL

## 2019-07-17 VITALS
HEIGHT: 69 IN | WEIGHT: 142 LBS | BODY MASS INDEX: 21.03 KG/M2 | DIASTOLIC BLOOD PRESSURE: 74 MMHG | SYSTOLIC BLOOD PRESSURE: 114 MMHG

## 2019-07-17 DIAGNOSIS — Z01.419 ENCOUNTER FOR WELL WOMAN EXAM: Primary | ICD-10-CM

## 2019-07-17 DIAGNOSIS — Z12.4 ROUTINE CERVICAL SMEAR: ICD-10-CM

## 2019-07-17 DIAGNOSIS — Z72.51 HIGH RISK HETEROSEXUAL BEHAVIOR: ICD-10-CM

## 2019-07-17 DIAGNOSIS — Z01.419 CERVICAL SMEAR, AS PART OF ROUTINE GYNECOLOGICAL EXAMINATION: ICD-10-CM

## 2019-07-17 DIAGNOSIS — Z11.8 SCREENING FOR CHLAMYDIAL DISEASE: ICD-10-CM

## 2019-07-17 DIAGNOSIS — Z30.42 SURVEILLANCE FOR INJECTABLE MEDROXYPROGESTERONE/ESTRADIOL: ICD-10-CM

## 2019-07-17 DIAGNOSIS — Z11.3 SCREENING FOR STDS (SEXUALLY TRANSMITTED DISEASES): ICD-10-CM

## 2019-07-17 PROCEDURE — S0612 ANNUAL GYNECOLOGICAL EXAMINA: HCPCS | Performed by: PHYSICIAN ASSISTANT

## 2019-07-17 RX ORDER — MEDROXYPROGESTERONE ACETATE 150 MG/ML
150 INJECTION, SUSPENSION INTRAMUSCULAR
Qty: 1 ML | Refills: 4 | Status: SHIPPED | OUTPATIENT
Start: 2019-07-17

## 2019-07-17 NOTE — PROGRESS NOTES
Assessment/Plan:    No problem-specific Assessment & Plan notes found for this encounter  Diagnoses and all orders for this visit:    Encounter for well woman exam    Routine cervical smear    Screening for STDs (sexually transmitted diseases)  -     GP PAP/CT/GC (Reflex HPV PLUS when ASC-US)    Screening for chlamydial disease  -     GP PAP/CT/GC (Reflex HPV PLUS when ASC-US)    High risk heterosexual behavior  -     GP PAP/CT/GC (Reflex HPV PLUS when ASC-US)    Cervical smear, as part of routine gynecological examination  -     GP PAP/CT/GC (Reflex HPV PLUS when ASC-US)    Surveillance for injectable medroxyprogesterone/estradiol  -     medroxyPROGESTERone (DEPO-PROVERA) 150 mg/mL injection; Inject 1 mL (150 mg total) into a muscle every 3 (three) months          Subjective:      Patient ID: Alberto Ortez is a 26 y o  female  Pt presents for her annual exam today--  She has no complaints  She has no bleeding or pelvic pain--on Depo  Bowel and bladder are regular  No breast concerns today    pap today  By student  Cultures done  rx depo 150mg  H/o urea  smoker       The following portions of the patient's history were reviewed and updated as appropriate: allergies, current medications, past family history, past medical history, past social history, past surgical history and problem list     Review of Systems   Constitutional: Negative for chills, fever and unexpected weight change  Gastrointestinal: Negative for abdominal pain, blood in stool, constipation and diarrhea  Genitourinary: Negative  Objective:      /74 (BP Location: Right arm, Patient Position: Sitting, Cuff Size: Standard)   Ht 5' 9" (1 753 m)   Wt 64 4 kg (142 lb)   BMI 20 97 kg/m²          Physical Exam   Constitutional: She appears well-developed and well-nourished  HENT:   Head: Normocephalic and atraumatic  Neck: Normal range of motion     Pulmonary/Chest: Right breast exhibits no inverted nipple, no mass, no nipple discharge and no skin change  Left breast exhibits no inverted nipple, no mass, no nipple discharge and no skin change  Abdominal: Soft  Genitourinary: Vagina normal and uterus normal  Pelvic exam was performed with patient supine  There is no rash, tenderness or lesion on the right labia  There is no rash, tenderness or lesion on the left labia  Cervix exhibits no motion tenderness, no discharge and no friability  Right adnexum displays no mass, no tenderness and no fullness  Left adnexum displays no mass, no tenderness and no fullness  Lymphadenopathy: No inguinal adenopathy noted on the right or left side  Nursing note and vitals reviewed

## 2019-07-17 NOTE — PROGRESS NOTES
Patient is here for yearly exam  Patient is currently using Depo injection for birth control  Patient has no complaints, no breast concerns and B&B working well  pap normal, Gc/chlamydia neg 7/13/18  H/o ureaplasma

## 2019-07-24 LAB
DEPRECATED C TRACH RRNA XXX QL PRB: NOT DETECTED
N GONORRHOEA DNA UR QL NAA+PROBE: NOT DETECTED
THIN PREP CVX: ABNORMAL

## 2019-09-11 ENCOUNTER — OFFICE VISIT (OUTPATIENT)
Dept: OBGYN CLINIC | Facility: CLINIC | Age: 24
End: 2019-09-11
Payer: COMMERCIAL

## 2019-09-11 VITALS — DIASTOLIC BLOOD PRESSURE: 70 MMHG | SYSTOLIC BLOOD PRESSURE: 130 MMHG

## 2019-09-11 DIAGNOSIS — Z30.42 ENCOUNTER FOR SURVEILLANCE OF INJECTABLE CONTRACEPTIVE: Primary | ICD-10-CM

## 2019-09-11 PROCEDURE — 96372 THER/PROPH/DIAG INJ SC/IM: CPT

## 2019-09-11 RX ORDER — MEDROXYPROGESTERONE ACETATE 150 MG/ML
150 INJECTION, SUSPENSION INTRAMUSCULAR ONCE
Status: COMPLETED | OUTPATIENT
Start: 2019-09-11 | End: 2019-09-11

## 2019-09-11 RX ORDER — GABAPENTIN 100 MG/1
100 CAPSULE ORAL 2 TIMES DAILY
Refills: 0 | COMMUNITY
Start: 2019-07-03

## 2019-09-11 RX ADMIN — MEDROXYPROGESTERONE ACETATE 150 MG: 150 INJECTION, SUSPENSION INTRAMUSCULAR at 10:24

## 2019-09-11 NOTE — PROGRESS NOTES
The patient is here for a depo injection in the RIGHT Delt  The patient has no complaints  The patient tolerated the injection well       LOT: [id number]  EXP:8/2021  UET:[id number]

## 2019-11-27 ENCOUNTER — CLINICAL SUPPORT (OUTPATIENT)
Dept: OBGYN CLINIC | Facility: CLINIC | Age: 24
End: 2019-11-27
Payer: COMMERCIAL

## 2019-11-27 VITALS
HEIGHT: 69 IN | WEIGHT: 150 LBS | SYSTOLIC BLOOD PRESSURE: 114 MMHG | DIASTOLIC BLOOD PRESSURE: 72 MMHG | BODY MASS INDEX: 22.22 KG/M2

## 2019-11-27 DIAGNOSIS — Z30.42 SURVEILLANCE OF CONTRACEPTIVE INJECTION: Primary | ICD-10-CM

## 2019-11-27 PROCEDURE — 96372 THER/PROPH/DIAG INJ SC/IM: CPT

## 2019-11-27 RX ORDER — MEDROXYPROGESTERONE ACETATE 150 MG/ML
150 INJECTION, SUSPENSION INTRAMUSCULAR ONCE
Status: COMPLETED | OUTPATIENT
Start: 2019-11-27 | End: 2019-11-27

## 2019-11-27 RX ADMIN — MEDROXYPROGESTERONE ACETATE 150 MG: 150 INJECTION, SUSPENSION INTRAMUSCULAR at 14:51

## 2019-11-27 NOTE — PROGRESS NOTES
Pt here for depo injection in LEFT delt        Lot# [id number]   Exp 7/21   [facility] [id number]

## 2020-02-12 ENCOUNTER — OFFICE VISIT (OUTPATIENT)
Dept: OBGYN CLINIC | Facility: CLINIC | Age: 25
End: 2020-02-12
Payer: COMMERCIAL

## 2020-02-12 VITALS — DIASTOLIC BLOOD PRESSURE: 60 MMHG | SYSTOLIC BLOOD PRESSURE: 110 MMHG

## 2020-02-12 DIAGNOSIS — Z30.41 ENCOUNTER FOR SURVEILLANCE OF CONTRACEPTIVE PILLS: Primary | ICD-10-CM

## 2020-02-12 PROCEDURE — 96372 THER/PROPH/DIAG INJ SC/IM: CPT | Performed by: OBSTETRICS & GYNECOLOGY

## 2020-02-12 RX ORDER — MEDROXYPROGESTERONE ACETATE 150 MG/ML
150 INJECTION, SUSPENSION INTRAMUSCULAR ONCE
Status: COMPLETED | OUTPATIENT
Start: 2020-02-12 | End: 2020-02-12

## 2020-02-12 RX ADMIN — MEDROXYPROGESTERONE ACETATE 150 MG: 150 INJECTION, SUSPENSION INTRAMUSCULAR at 15:28

## 2020-02-12 NOTE — PROGRESS NOTES
The patient is here for a depo injection in the LEFT DELT  No bleeding or cramping  The patient has no complaints  The patient tolerated the injection well       LOT: [id number]  EXP: 8/2021  NDC: [address]-0

## 2020-04-30 ENCOUNTER — OFFICE VISIT (OUTPATIENT)
Dept: OBGYN CLINIC | Facility: CLINIC | Age: 25
End: 2020-04-30
Payer: COMMERCIAL

## 2020-04-30 VITALS — DIASTOLIC BLOOD PRESSURE: 60 MMHG | SYSTOLIC BLOOD PRESSURE: 160 MMHG

## 2020-04-30 DIAGNOSIS — Z30.42 ENCOUNTER FOR SURVEILLANCE OF INJECTABLE CONTRACEPTIVE: Primary | ICD-10-CM

## 2020-04-30 PROCEDURE — 96372 THER/PROPH/DIAG INJ SC/IM: CPT

## 2020-04-30 RX ORDER — MEDROXYPROGESTERONE ACETATE 150 MG/ML
150 INJECTION, SUSPENSION INTRAMUSCULAR ONCE
Status: COMPLETED | OUTPATIENT
Start: 2020-04-30 | End: 2020-04-30

## 2020-04-30 RX ADMIN — MEDROXYPROGESTERONE ACETATE 150 MG: 150 INJECTION, SUSPENSION INTRAMUSCULAR at 13:00

## 2020-07-20 ENCOUNTER — ANNUAL EXAM (OUTPATIENT)
Dept: OBGYN CLINIC | Facility: CLINIC | Age: 25
End: 2020-07-20
Payer: COMMERCIAL

## 2020-07-20 VITALS
SYSTOLIC BLOOD PRESSURE: 110 MMHG | HEIGHT: 69 IN | BODY MASS INDEX: 23.7 KG/M2 | TEMPERATURE: 97.8 F | WEIGHT: 160 LBS | DIASTOLIC BLOOD PRESSURE: 60 MMHG

## 2020-07-20 DIAGNOSIS — Z01.419 ENCOUNTER FOR WELL WOMAN EXAM: Primary | ICD-10-CM

## 2020-07-20 DIAGNOSIS — Z12.39 ENCOUNTER FOR SCREENING BREAST EXAMINATION: ICD-10-CM

## 2020-07-20 DIAGNOSIS — Z11.8 SPECIAL SCREENING EXAMINATION FOR CHLAMYDIAL DISEASE: ICD-10-CM

## 2020-07-20 DIAGNOSIS — R87.612 LOW GRADE SQUAMOUS INTRAEPITH LESION ON CYTOLOGIC SMEAR CERVIX (LGSIL): ICD-10-CM

## 2020-07-20 DIAGNOSIS — Z30.42 SURVEILLANCE FOR INJECTABLE MEDROXYPROGESTERONE/ESTRADIOL: ICD-10-CM

## 2020-07-20 DIAGNOSIS — Z72.51 HIGH RISK HETEROSEXUAL BEHAVIOR: ICD-10-CM

## 2020-07-20 DIAGNOSIS — Z11.3 SCREENING EXAMINATION FOR STD (SEXUALLY TRANSMITTED DISEASE): ICD-10-CM

## 2020-07-20 DIAGNOSIS — Z30.42 SURVEILLANCE OF CONTRACEPTIVE INJECTION: ICD-10-CM

## 2020-07-20 PROCEDURE — S0612 ANNUAL GYNECOLOGICAL EXAMINA: HCPCS | Performed by: PHYSICIAN ASSISTANT

## 2020-07-20 RX ORDER — MEDROXYPROGESTERONE ACETATE 150 MG/ML
150 INJECTION, SUSPENSION INTRAMUSCULAR
Qty: 1 ML | Refills: 4 | Status: SHIPPED | OUTPATIENT
Start: 2020-07-20

## 2020-07-20 NOTE — PROGRESS NOTES
Patient is here for yearly exam  Patient is doing well on Depo injection, would like to continue  Does not get a cycle  No breast concerns and B&B ok  Patient is due for a pap smear and GC/Chlamydia cultures  at this visit  7/17/19 LGSIL  7/13/18 Normal Pap, Negative Gc/Chlmaydia  H/o ureaplasma

## 2020-07-20 NOTE — PROGRESS NOTES
Assessment/Plan:    No problem-specific Assessment & Plan notes found for this encounter  Diagnoses and all orders for this visit:    Encounter for well woman exam    Encounter for screening breast examination    Surveillance of contraceptive injection    Surveillance for injectable medroxyprogesterone/estradiol  -     medroxyPROGESTERone (DEPO-PROVERA) 150 mg/mL injection; Inject 1 mL (150 mg total) into a muscle every 3 (three) months    Special screening examination for chlamydial disease  -     GP PAP/CT/GC (Reflex HPV PLUS when ASC-US)    Screening examination for STD (sexually transmitted disease)  -     GP PAP/CT/GC (Reflex HPV PLUS when ASC-US)    Low grade squamous intraepith lesion on cytologic smear cervix (lgsil)  -     GP PAP/CT/GC (Reflex HPV PLUS when ASC-US)    High risk heterosexual behavior  -     GP PAP/CT/GC (Reflex HPV PLUS when ASC-US)    Other orders  -     Cancel: GP PAP (RFLX HPV Plus whenASC-US)          Subjective:      Patient ID: Liz Magaña is a 22 y o  female  Pt presents for her annual exam today--  She has no complaints  She has no bleeding or pelvic pain--on Depo  Bowel and bladder are regular  No breast concerns today  Back from [place] and has completed 2 week quarantine    Cultures and pap today  Rx Depo  Daily mvi      The following portions of the patient's history were reviewed and updated as appropriate: allergies, current medications, past family history, past medical history, past social history, past surgical history and problem list     Review of Systems   Constitutional: Negative for chills, fever and unexpected weight change  Gastrointestinal: Negative for abdominal pain, blood in stool, constipation and diarrhea  Genitourinary: Negative            Objective:      /60   Temp 97 8 °F (36 6 °C)   Ht 5' 9" (1 753 m)   Wt 72 6 kg (160 lb)   Breastfeeding No   BMI 23 63 kg/m²          Physical Exam   Constitutional: She appears well-developed and well-nourished  HENT:   Head: Normocephalic and atraumatic  Neck: Normal range of motion  Pulmonary/Chest: Right breast exhibits no inverted nipple, no mass, no nipple discharge and no skin change  Left breast exhibits no inverted nipple, no mass, no nipple discharge and no skin change  Abdominal: Soft  Genitourinary: Vagina normal and uterus normal  Pelvic exam was performed with patient supine  There is no rash, tenderness or lesion on the right labia  There is no rash, tenderness or lesion on the left labia  Cervix exhibits no motion tenderness, no discharge and no friability  Right adnexum displays no mass, no tenderness and no fullness  Left adnexum displays no mass, no tenderness and no fullness  Lymphadenopathy: No inguinal adenopathy noted on the right or left side  Nursing note and vitals reviewed

## 2020-07-21 ENCOUNTER — CLINICAL SUPPORT (OUTPATIENT)
Dept: OBGYN CLINIC | Facility: CLINIC | Age: 25
End: 2020-07-21
Payer: COMMERCIAL

## 2020-07-21 VITALS
BODY MASS INDEX: 23.7 KG/M2 | DIASTOLIC BLOOD PRESSURE: 80 MMHG | WEIGHT: 160 LBS | SYSTOLIC BLOOD PRESSURE: 122 MMHG | HEIGHT: 69 IN

## 2020-07-21 DIAGNOSIS — Z30.42 SURVEILLANCE FOR INJECTABLE MEDROXYPROGESTERONE/ESTRADIOL: Primary | ICD-10-CM

## 2020-07-21 PROCEDURE — 96372 THER/PROPH/DIAG INJ SC/IM: CPT | Performed by: PHYSICIAN ASSISTANT

## 2020-07-21 RX ORDER — MEDROXYPROGESTERONE ACETATE 150 MG/ML
150 INJECTION, SUSPENSION INTRAMUSCULAR ONCE
Status: COMPLETED | OUTPATIENT
Start: 2020-07-21 | End: 2020-07-21

## 2020-07-21 RX ADMIN — MEDROXYPROGESTERONE ACETATE 150 MG: 150 INJECTION, SUSPENSION INTRAMUSCULAR at 14:12

## 2020-07-21 NOTE — PROGRESS NOTES
Patient is here for depo injection in RIGHT DELT  Patient is doing well on depo and would like to continue  She has no complaints today  Patient tolerated injection well        LOT #:[id number]  EXP:04/2022  FSM:[id number]

## 2020-07-26 LAB
DEPRECATED C TRACH RRNA XXX QL PRB: NOT DETECTED
N GONORRHOEA DNA UR QL NAA+PROBE: NOT DETECTED
THIN PREP CVX: NORMAL

## 2020-10-08 ENCOUNTER — OFFICE VISIT (OUTPATIENT)
Dept: OBGYN CLINIC | Facility: CLINIC | Age: 25
End: 2020-10-08
Payer: COMMERCIAL

## 2020-10-08 VITALS — HEIGHT: 69 IN | DIASTOLIC BLOOD PRESSURE: 80 MMHG | BODY MASS INDEX: 23.63 KG/M2 | SYSTOLIC BLOOD PRESSURE: 120 MMHG

## 2020-10-08 DIAGNOSIS — Z30.42 SURVEILLANCE FOR INJECTABLE MEDROXYPROGESTERONE/ESTRADIOL: Primary | ICD-10-CM

## 2020-10-08 PROCEDURE — 96372 THER/PROPH/DIAG INJ SC/IM: CPT

## 2020-10-08 RX ORDER — MEDROXYPROGESTERONE ACETATE 150 MG/ML
150 INJECTION, SUSPENSION INTRAMUSCULAR ONCE
Status: COMPLETED | OUTPATIENT
Start: 2020-10-08 | End: 2020-10-08

## 2020-10-08 RX ADMIN — MEDROXYPROGESTERONE ACETATE 150 MG: 150 INJECTION, SUSPENSION INTRAMUSCULAR at 11:20

## 2020-12-28 ENCOUNTER — OFFICE VISIT (OUTPATIENT)
Dept: OBGYN CLINIC | Facility: CLINIC | Age: 25
End: 2020-12-28
Payer: COMMERCIAL

## 2020-12-28 VITALS — DIASTOLIC BLOOD PRESSURE: 78 MMHG | HEIGHT: 69 IN | BODY MASS INDEX: 23.63 KG/M2 | SYSTOLIC BLOOD PRESSURE: 112 MMHG

## 2020-12-28 DIAGNOSIS — Z30.42 SURVEILLANCE FOR INJECTABLE MEDROXYPROGESTERONE/ESTRADIOL: Primary | ICD-10-CM

## 2020-12-28 PROCEDURE — 96372 THER/PROPH/DIAG INJ SC/IM: CPT

## 2020-12-28 RX ORDER — MEDROXYPROGESTERONE ACETATE 150 MG/ML
150 INJECTION, SUSPENSION INTRAMUSCULAR ONCE
Status: COMPLETED | OUTPATIENT
Start: 2020-12-28 | End: 2020-12-28

## 2020-12-28 RX ORDER — PERPHENAZINE 8 MG
8 TABLET ORAL 2 TIMES DAILY
COMMUNITY

## 2020-12-28 RX ADMIN — MEDROXYPROGESTERONE ACETATE 150 MG: 150 INJECTION, SUSPENSION INTRAMUSCULAR at 10:08

## 2021-03-15 ENCOUNTER — OFFICE VISIT (OUTPATIENT)
Dept: OBGYN CLINIC | Facility: CLINIC | Age: 26
End: 2021-03-15
Payer: COMMERCIAL

## 2021-03-15 VITALS — SYSTOLIC BLOOD PRESSURE: 140 MMHG | DIASTOLIC BLOOD PRESSURE: 90 MMHG

## 2021-03-15 DIAGNOSIS — Z30.42 SURVEILLANCE FOR INJECTABLE MEDROXYPROGESTERONE/ESTRADIOL: Primary | ICD-10-CM

## 2021-03-15 PROCEDURE — 96372 THER/PROPH/DIAG INJ SC/IM: CPT

## 2021-03-15 RX ORDER — MEDROXYPROGESTERONE ACETATE 150 MG/ML
150 INJECTION, SUSPENSION INTRAMUSCULAR ONCE
Status: COMPLETED | OUTPATIENT
Start: 2021-03-15 | End: 2021-03-15

## 2021-03-15 RX ADMIN — MEDROXYPROGESTERONE ACETATE 150 MG: 150 INJECTION, SUSPENSION INTRAMUSCULAR at 15:53

## 2021-03-15 NOTE — PROGRESS NOTES
Patient is here for depo injection in LEFT DELT  Patient is doing well on depo and would like to continue  Patient tolerated injection well       LOT #:[id number]  SL:57/7205  ZVQ:[id number]

## 2021-06-01 ENCOUNTER — OFFICE VISIT (OUTPATIENT)
Dept: OBGYN CLINIC | Facility: CLINIC | Age: 26
End: 2021-06-01
Payer: COMMERCIAL

## 2021-06-01 VITALS — DIASTOLIC BLOOD PRESSURE: 78 MMHG | SYSTOLIC BLOOD PRESSURE: 120 MMHG

## 2021-06-01 DIAGNOSIS — Z30.42 ENCOUNTER FOR SURVEILLANCE OF INJECTABLE CONTRACEPTIVE: Primary | ICD-10-CM

## 2021-06-01 PROCEDURE — 96372 THER/PROPH/DIAG INJ SC/IM: CPT

## 2021-06-01 RX ORDER — MEDROXYPROGESTERONE ACETATE 150 MG/ML
150 INJECTION, SUSPENSION INTRAMUSCULAR ONCE
Status: COMPLETED | OUTPATIENT
Start: 2021-06-01 | End: 2021-06-01

## 2021-06-01 RX ADMIN — MEDROXYPROGESTERONE ACETATE 150 MG: 150 INJECTION, SUSPENSION INTRAMUSCULAR at 15:58

## 2021-06-01 NOTE — PROGRESS NOTES
The patient is here for a depo injection in the RIGHT DELT  The patient had dark red spotting last month for a day but then the spotting went away  No pelvic pain  The patient tolerated the injection well       LOT: [id number]  EXP: 2/2024  [facility]: [id number]

## 2021-07-21 ENCOUNTER — ANNUAL EXAM (OUTPATIENT)
Dept: OBGYN CLINIC | Facility: CLINIC | Age: 26
End: 2021-07-21
Payer: COMMERCIAL

## 2021-07-21 VITALS
WEIGHT: 142 LBS | HEIGHT: 69 IN | DIASTOLIC BLOOD PRESSURE: 86 MMHG | SYSTOLIC BLOOD PRESSURE: 120 MMHG | BODY MASS INDEX: 21.03 KG/M2

## 2021-07-21 DIAGNOSIS — Z01.419 CERVICAL SMEAR, AS PART OF ROUTINE GYNECOLOGICAL EXAMINATION: ICD-10-CM

## 2021-07-21 DIAGNOSIS — Z30.42 SURVEILLANCE FOR INJECTABLE MEDROXYPROGESTERONE/ESTRADIOL: ICD-10-CM

## 2021-07-21 DIAGNOSIS — Z01.419 ENCOUNTER FOR WELL WOMAN EXAM: Primary | ICD-10-CM

## 2021-07-21 DIAGNOSIS — Z72.51 HIGH RISK HETEROSEXUAL BEHAVIOR: ICD-10-CM

## 2021-07-21 DIAGNOSIS — Z11.3 SCREENING EXAMINATION FOR STD (SEXUALLY TRANSMITTED DISEASE): ICD-10-CM

## 2021-07-21 DIAGNOSIS — Z11.8 SPECIAL SCREENING EXAMINATION FOR CHLAMYDIAL DISEASE: ICD-10-CM

## 2021-07-21 DIAGNOSIS — Z12.39 ENCOUNTER FOR SCREENING BREAST EXAMINATION: ICD-10-CM

## 2021-07-21 PROCEDURE — S0612 ANNUAL GYNECOLOGICAL EXAMINA: HCPCS | Performed by: PHYSICIAN ASSISTANT

## 2021-07-21 RX ORDER — MEDROXYPROGESTERONE ACETATE 150 MG/ML
150 INJECTION, SUSPENSION INTRAMUSCULAR
Qty: 1 ML | Refills: 4 | Status: SHIPPED | OUTPATIENT
Start: 2021-07-21

## 2021-07-21 NOTE — PROGRESS NOTES
Patient is here for yearly exam   Patient is doing well on Depo and would like to continue,  Patient has no breast concerns and B&B ok  Patient is due for a pap smear with GC/Chlamydia testing at this visit  7/20/20 Normal Pap    7/17/19 LGSIL  7/13/18 Normal Pap, Negative Gc/Chlmaydia  DEPO  H/o ureaplasma

## 2021-07-21 NOTE — PROGRESS NOTES
Assessment/Plan:    No problem-specific Assessment & Plan notes found for this encounter  Diagnoses and all orders for this visit:    Encounter for well woman exam    Encounter for screening breast examination    Cervical smear, as part of routine gynecological examination  -     GP PAP/CT/GC (Reflex HPV PLUS when ASC-US)    Screening examination for STD (sexually transmitted disease)  -     GP PAP/CT/GC (Reflex HPV PLUS when ASC-US)    Special screening examination for chlamydial disease  -     GP PAP/CT/GC (Reflex HPV PLUS when ASC-US)    High risk heterosexual behavior  -     GP PAP/CT/GC (Reflex HPV PLUS when ASC-US)    Surveillance for injectable medroxyprogesterone/estradiol  -     medroxyPROGESTERone (DEPO-PROVERA) 150 mg/mL injection; Inject 1 mL (150 mg total) into a muscle every 3 (three) months          Subjective:      Patient ID: Alberto Ortez is a 26 y o  female  Pt presents for her annual exam today--  She has no complaints  No changes  She has no bleeding or pelvic pain--depo, loves it  Bowel and bladder are regular  No breast concerns today      pap today  H/o hpv  rx depo1 50mg  Daily mvi!! The following portions of the patient's history were reviewed and updated as appropriate: allergies, current medications, past family history, past medical history, past social history, past surgical history and problem list     Review of Systems   Constitutional: Negative for chills, fever and unexpected weight change  Gastrointestinal: Negative for abdominal pain, blood in stool, constipation and diarrhea  Genitourinary: Negative  Objective:      /86   Ht 5' 9" (1 753 m)   Wt 64 4 kg (142 lb)   BMI 20 97 kg/m²          Physical Exam  Vitals and nursing note reviewed  Constitutional:       Appearance: She is well-developed  HENT:      Head: Normocephalic and atraumatic  Chest:      Breasts:         Right: No inverted nipple, mass, nipple discharge or skin change  Left: No inverted nipple, mass, nipple discharge or skin change  Abdominal:      Palpations: Abdomen is soft  Genitourinary:     Exam position: Supine  Labia:         Right: No rash, tenderness or lesion  Left: No rash, tenderness or lesion  Vagina: Normal       Cervix: No cervical motion tenderness, discharge or friability  Adnexa:         Right: No mass, tenderness or fullness  Left: No mass, tenderness or fullness  Musculoskeletal:      Cervical back: Normal range of motion  Lymphadenopathy:      Lower Body: No right inguinal adenopathy  No left inguinal adenopathy

## 2021-07-29 LAB
DEPRECATED C TRACH RRNA XXX QL PRB: DETECTED
N GONORRHOEA DNA UR QL NAA+PROBE: NOT DETECTED
THIN PREP CVX: ABNORMAL

## 2021-07-30 ENCOUNTER — TELEPHONE (OUTPATIENT)
Dept: OBGYN CLINIC | Facility: CLINIC | Age: 26
End: 2021-07-30

## 2021-07-30 NOTE — TELEPHONE ENCOUNTER
Looks like pt was told about pap but not chlamydia---please advise  +chlamydia  Needs rx doxy 100mg bid x 7 days  Partner tx and repeat culture after abx

## 2021-07-30 NOTE — TELEPHONE ENCOUNTER
[employer] called to see if the patient was treated for chlamydia, they will call back Monday  To see if patient was contacted and treated

## 2021-08-03 DIAGNOSIS — A74.9 CHLAMYDIA: Primary | ICD-10-CM

## 2021-08-03 RX ORDER — DOXYCYCLINE 100 MG/1
100 TABLET ORAL 2 TIMES DAILY
Qty: 14 TABLET | Refills: 0 | Status: SHIPPED | OUTPATIENT
Start: 2021-08-03 | End: 2021-08-10

## 2021-08-17 ENCOUNTER — OFFICE VISIT (OUTPATIENT)
Dept: OBGYN CLINIC | Facility: CLINIC | Age: 26
End: 2021-08-17
Payer: COMMERCIAL

## 2021-08-17 VITALS — DIASTOLIC BLOOD PRESSURE: 72 MMHG | SYSTOLIC BLOOD PRESSURE: 110 MMHG

## 2021-08-17 DIAGNOSIS — Z30.42 SURVEILLANCE FOR INJECTABLE MEDROXYPROGESTERONE/ESTRADIOL: Primary | ICD-10-CM

## 2021-08-17 PROCEDURE — 96372 THER/PROPH/DIAG INJ SC/IM: CPT

## 2021-08-17 RX ORDER — MEDROXYPROGESTERONE ACETATE 150 MG/ML
150 INJECTION, SUSPENSION INTRAMUSCULAR ONCE
Status: COMPLETED | OUTPATIENT
Start: 2021-08-17 | End: 2021-08-17

## 2021-08-17 RX ADMIN — MEDROXYPROGESTERONE ACETATE 150 MG: 150 INJECTION, SUSPENSION INTRAMUSCULAR at 10:00

## 2021-08-17 NOTE — PROGRESS NOTES
Patient is here for Depo injection in LEFT DELT  Patient is doing well on Depo and would like to continue  Patient tolerated injection well      LOT #:[id number]  EXP:3/2023  NAW:[id number]

## 2021-08-23 ENCOUNTER — OFFICE VISIT (OUTPATIENT)
Dept: OBGYN CLINIC | Facility: CLINIC | Age: 26
End: 2021-08-23
Payer: COMMERCIAL

## 2021-08-23 VITALS
DIASTOLIC BLOOD PRESSURE: 70 MMHG | WEIGHT: 137.8 LBS | BODY MASS INDEX: 20.41 KG/M2 | SYSTOLIC BLOOD PRESSURE: 112 MMHG | HEIGHT: 69 IN

## 2021-08-23 DIAGNOSIS — A74.9 POSITIVE CHLAMYDIA PCR: Primary | ICD-10-CM

## 2021-08-23 DIAGNOSIS — Z11.3 SCREENING EXAMINATION FOR STD (SEXUALLY TRANSMITTED DISEASE): ICD-10-CM

## 2021-08-23 DIAGNOSIS — Z11.8 SPECIAL SCREENING EXAMINATION FOR CHLAMYDIAL DISEASE: ICD-10-CM

## 2021-08-23 PROCEDURE — 99213 OFFICE O/P EST LOW 20 MIN: CPT | Performed by: PHYSICIAN ASSISTANT

## 2021-08-23 NOTE — PROGRESS NOTES
Patient is here for re-check chlamydia  Patient and partner were treated  7/21/21 ASCUS, +Chlamydia

## 2021-08-23 NOTE — PROGRESS NOTES
Assessment/Plan:    No problem-specific Assessment & Plan notes found for this encounter  Diagnoses and all orders for this visit:    Positive Chlamydia PCR  -     GP Chlamydia, Liquid-Based    Screening examination for STD (sexually transmitted disease)    Special screening examination for chlamydial disease          Subjective:      Patient ID: Vandana Pickard is a 26 y o  female  Pt presents for a repeat culture today  H/o + chlamydia, s/p abx  Feeling great  No sxs  On Depo--no bleeding    Repeat culture done today--      The following portions of the patient's history were reviewed and updated as appropriate: allergies, current medications, past family history, past medical history, past social history, past surgical history and problem list     Review of Systems   Constitutional: Negative for chills, fever and unexpected weight change  Gastrointestinal: Negative for abdominal pain, blood in stool, constipation and diarrhea  Genitourinary: Negative  Objective:      /70   Ht 5' 9" (1 753 m)   Wt 62 5 kg (137 lb 12 8 oz)   BMI 20 35 kg/m²          Physical Exam  Vitals and nursing note reviewed  Constitutional:       Appearance: She is well-developed  Genitourinary:     Exam position: Supine  Labia:         Right: No rash or lesion  Left: No rash or lesion  Vagina: Normal       Cervix: No cervical motion tenderness, discharge or friability  Lymphadenopathy:      Lower Body: No right inguinal adenopathy  No left inguinal adenopathy

## 2021-08-24 LAB — DEPRECATED C TRACH RRNA XXX QL PRB: NOT DETECTED

## 2021-11-02 ENCOUNTER — OFFICE VISIT (OUTPATIENT)
Dept: OBGYN CLINIC | Facility: CLINIC | Age: 26
End: 2021-11-02
Payer: COMMERCIAL

## 2021-11-02 VITALS — DIASTOLIC BLOOD PRESSURE: 82 MMHG | SYSTOLIC BLOOD PRESSURE: 130 MMHG

## 2021-11-02 DIAGNOSIS — Z30.42 ENCOUNTER FOR SURVEILLANCE OF INJECTABLE CONTRACEPTIVE: Primary | ICD-10-CM

## 2021-11-02 PROCEDURE — 96372 THER/PROPH/DIAG INJ SC/IM: CPT

## 2021-11-02 RX ORDER — MEDROXYPROGESTERONE ACETATE 150 MG/ML
150 INJECTION, SUSPENSION INTRAMUSCULAR ONCE
Status: COMPLETED | OUTPATIENT
Start: 2021-11-02 | End: 2021-11-02

## 2021-11-02 RX ADMIN — MEDROXYPROGESTERONE ACETATE 150 MG: 150 INJECTION, SUSPENSION INTRAMUSCULAR at 13:45

## 2022-01-17 ENCOUNTER — OFFICE VISIT (OUTPATIENT)
Dept: OBGYN CLINIC | Facility: CLINIC | Age: 27
End: 2022-01-17
Payer: COMMERCIAL

## 2022-01-17 VITALS — SYSTOLIC BLOOD PRESSURE: 120 MMHG | DIASTOLIC BLOOD PRESSURE: 80 MMHG

## 2022-01-17 DIAGNOSIS — Z30.42 ENCOUNTER FOR SURVEILLANCE OF INJECTABLE CONTRACEPTIVE: Primary | ICD-10-CM

## 2022-01-17 PROCEDURE — NC001 PR NO CHARGE: Performed by: PHYSICIAN ASSISTANT

## 2022-01-17 PROCEDURE — 96372 THER/PROPH/DIAG INJ SC/IM: CPT

## 2022-01-17 RX ORDER — MEDROXYPROGESTERONE ACETATE 150 MG/ML
150 INJECTION, SUSPENSION INTRAMUSCULAR ONCE
Status: COMPLETED | OUTPATIENT
Start: 2022-01-17 | End: 2022-01-17

## 2022-01-17 RX ADMIN — MEDROXYPROGESTERONE ACETATE 150 MG: 150 INJECTION, SUSPENSION INTRAMUSCULAR at 14:00

## 2022-01-17 NOTE — PROGRESS NOTES
The patient is here for a depo injection in the LEFT DELT  The patient had light spotting last week  The patient has no concerns  The patient tolerated the injection well       LOT: [id number]  EXP: 7/2024  NDC: [address]-89

## 2022-04-04 ENCOUNTER — OFFICE VISIT (OUTPATIENT)
Dept: OBGYN CLINIC | Facility: CLINIC | Age: 27
End: 2022-04-04
Payer: COMMERCIAL

## 2022-04-04 VITALS — SYSTOLIC BLOOD PRESSURE: 130 MMHG | DIASTOLIC BLOOD PRESSURE: 80 MMHG

## 2022-04-04 DIAGNOSIS — Z30.42 ENCOUNTER FOR SURVEILLANCE OF INJECTABLE CONTRACEPTIVE: Primary | ICD-10-CM

## 2022-04-04 PROCEDURE — 96372 THER/PROPH/DIAG INJ SC/IM: CPT

## 2022-04-04 RX ORDER — MEDROXYPROGESTERONE ACETATE 150 MG/ML
150 INJECTION, SUSPENSION INTRAMUSCULAR ONCE
Status: COMPLETED | OUTPATIENT
Start: 2022-04-04 | End: 2022-04-04

## 2022-04-04 RX ADMIN — MEDROXYPROGESTERONE ACETATE 150 MG: 150 INJECTION, SUSPENSION INTRAMUSCULAR at 13:40

## 2022-04-04 NOTE — PROGRESS NOTES
The patient is here for a depo injection in the RIGHT DELT  The patient had spotting for the past week  No other concerns with the depo  The patient tolerated the injection well

## 2022-06-20 ENCOUNTER — OFFICE VISIT (OUTPATIENT)
Dept: OBGYN CLINIC | Facility: CLINIC | Age: 27
End: 2022-06-20
Payer: COMMERCIAL

## 2022-06-20 VITALS — SYSTOLIC BLOOD PRESSURE: 122 MMHG | DIASTOLIC BLOOD PRESSURE: 80 MMHG

## 2022-06-20 DIAGNOSIS — Z30.42 ENCOUNTER FOR SURVEILLANCE OF INJECTABLE CONTRACEPTIVE: Primary | ICD-10-CM

## 2022-06-20 PROCEDURE — 96372 THER/PROPH/DIAG INJ SC/IM: CPT

## 2022-06-20 RX ORDER — MEDROXYPROGESTERONE ACETATE 150 MG/ML
150 INJECTION, SUSPENSION INTRAMUSCULAR ONCE
Status: COMPLETED | OUTPATIENT
Start: 2022-06-20 | End: 2022-06-20

## 2022-06-20 RX ADMIN — MEDROXYPROGESTERONE ACETATE 150 MG: 150 INJECTION, SUSPENSION INTRAMUSCULAR at 09:50

## 2022-06-20 NOTE — PROGRESS NOTES
The patient was given a depo injection in the LEFT DELT  The patient had dark brown spotting for a week two weeks ago  The patient tolerated the injection well

## 2023-06-27 ENCOUNTER — APPOINTMENT (EMERGENCY)
Dept: RADIOLOGY | Facility: HOSPITAL | Age: 28
End: 2023-06-27
Payer: OTHER MISCELLANEOUS

## 2023-06-27 ENCOUNTER — HOSPITAL ENCOUNTER (EMERGENCY)
Facility: HOSPITAL | Age: 28
Discharge: HOME | End: 2023-06-27
Attending: EMERGENCY MEDICINE
Payer: OTHER MISCELLANEOUS

## 2023-06-27 VITALS
RESPIRATION RATE: 18 BRPM | TEMPERATURE: 98 F | HEART RATE: 115 BPM | DIASTOLIC BLOOD PRESSURE: 111 MMHG | OXYGEN SATURATION: 100 % | SYSTOLIC BLOOD PRESSURE: 162 MMHG

## 2023-06-27 DIAGNOSIS — S61.209A AVULSION OF FINGERTIP, INITIAL ENCOUNTER: Primary | ICD-10-CM

## 2023-06-27 PROCEDURE — 73140 X-RAY EXAM OF FINGER(S): CPT

## 2023-06-27 RX ORDER — TRANEXAMIC ACID 100 MG/ML
1000 INJECTION, SOLUTION INTRAVENOUS ONCE
Status: COMPLETED | OUTPATIENT
Start: 2023-06-27 | End: 2023-06-27

## 2023-06-27 RX ORDER — NAPROXEN 500 MG/1
500 TABLET ORAL 2 TIMES DAILY WITH MEALS
Qty: 30 TABLET | Refills: 0 | Status: SHIPPED | OUTPATIENT
Start: 2023-06-27

## 2023-06-27 RX ORDER — LIDOCAINE HYDROCHLORIDE AND EPINEPHRINE 10; 10 MG/ML; UG/ML
20 INJECTION, SOLUTION INFILTRATION; PERINEURAL ONCE
Status: COMPLETED | OUTPATIENT
Start: 2023-06-27 | End: 2023-06-27

## 2023-06-27 RX ADMIN — LIDOCAINE HYDROCHLORIDE,EPINEPHRINE BITARTRATE 20 ML: 10; .01 INJECTION, SOLUTION INFILTRATION; PERINEURAL at 19:13

## 2023-06-27 RX ADMIN — TRANEXAMIC ACID 1000 MG: 100 INJECTION INTRAVENOUS at 20:05

## 2023-06-27 NOTE — Clinical Note
Ananth Murillo was seen and treated in our emergency department on 6/27/2023  Light duties until cleared by hand surgery    Diagnosis:     Kayce Dave  may return to work on return date  She may return on this date: 06/30/2023         If you have any questions or concerns, please don't hesitate to call        Clive Gross MD    ______________________________           _______________          _______________  Hospital Representative                              Date                                Time

## 2023-06-27 NOTE — ED PROVIDER NOTES
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 she inadvertently cut the tip of her left middle finger  Noted immediate bleeding and pain at that time  She put paper towels around it and came to the ER for evaluation  This occurred 1 and half hours ago  Denies any other injury  Reports that she is having persistent pain and bleeding  She denies any smoking or vascular risk factors  No other symptoms currently  Remainder of ROS Reviewed and Non-Pertinent    No other complaints for this encounter     - No language barrier    - History obtained from patient and chart   - Reviewed relevant past medical/family/social history  - There are no limitations to the history obtained  Past Medical, Past Surgical History:    has a past medical history of Anxiety, Anxiety, Papanicolaou smear for cervical cancer screening (07/2018), and STI (sexually transmitted infection) (06/28/2016)  has a past surgical history that includes Appendectomy (2010)       Allergies:   No Known Allergies    Social and Family History:     Social History     Substance and Sexual Activity   Alcohol Use Yes    Comment: rarely     Social History     Tobacco Use   Smoking Status Every Day   • Packs/day: 0 25   • Types: Cigarettes   Smokeless Tobacco Never     Social History     Substance and Sexual Activity   Drug Use Yes   • Types: Marijuana    Comment: daily       Physical Examination     Vitals:    06/27/23 1809   BP: (!) 162/111   Pulse: (!) 115   Resp: 18   Temp: 98 °F (36 7 °C)   TempSrc: Temporal   SpO2: 100%       Physical Exam: Patient is uncomfortable, in pain, benign cardiorespiratory exam, she has obvious fingertip avulsion of her left third medical finger, currently bleeding, as photographed below, no other evidence of injury              Risk Stratification Tools                Orders Placed This Encounter   Procedures   • Digital Block   • XR finger third digit-middle LEFT       Labs:   Labs Reviewed - No data to display    Imaging:     XR finger third digit-middle LEFT    (Results Pending)          Procedures   Digital Block    Date/Time: 6/27/2023 8:00 PM    Performed by: Clive Gross MD  Authorized by: Clive Gross MD    Consent:     Consent obtained:  Verbal    Consent given by:  Patient  Indications:     Indications:  Pain relief  Location:     Block location:  Finger    Finger blocked:  L long finger  Pre-procedure details:     Neurovascular status: intact      Skin preparation:  Alcohol  Procedure details (see MAR for exact dosages):     Needle gauge:  25 G    Anesthetic injected:  Lidocaine 1% WITH epi    Injection procedure:  Anatomic landmarks identified, incremental injection and negative aspiration for blood  Post-procedure details:     Outcome:  Anesthesia achieved    Patient tolerance of procedure: Tolerated well, no immediate complications          MDM:   Medical Decision Making  Lynette Good is a 33 y o  who presents with complaints of fingertip avulsion injury    Vital signs are remarkable for tachycardia, physical exam shows fingertip injury as per above    We will perform digital block, assess for bone involvement plain films, then will plan for secondary intention healing with TXA gauze and hand follow-up     No evidence of tuft fracture  Performed a digital block without complication  Proceeded to use topical TXA, soaked with lidocaine with epi, packed with Surgicel, and tight gauze dressing  Observe for 30 minutes prior to discharge, remained hemostatically controlled  Advised of any recurrent bleeding she should return for reevaluation  Provided hand surgery follow-up        Amount and/or "Complexity of Data Reviewed  Radiology: ordered  Risk  Prescription drug management  ED Course as of 06/28/23 0044   Tue Jun 27, 2023 1928 Digital block performed   1928 Pain relieved   1928 Awaiting on x-rays   2043 Dressing applied      Final Dispo   Final Diagnosis:  1  Avulsion of fingertip, initial encounter      Time reflects when diagnosis was documented in both MDM as applicable and the Disposition within this note     Time User Action Codes Description Comment    6/27/2023  9:00 PM Jeanette Burk Add [S61 209A] Avulsion of fingertip, initial encounter       ED Disposition     ED Disposition   Discharge    Condition   Stable    Date/Time   Tue Jun 27, 2023  8:55 PM    [facility] discharge to home/self care  Follow-up Information     Follow up With Specialties Details Why Contact Info    Luis Manuel Arreguin MD [facility] Medicine Call   [address]  [place] [address], MD Orthopedic Surgery, Hand Surgery   [place] [address]  [phone]          Medications   lidocaine-epinephrine (XYLOCAINE/EPINEPHRINE) 1 %-1:100,000 injection 20 mL (20 mL Infiltration Given 6/27/23 1913)   tranexamic acid 100mg/mL (for epistaxis) 1,000 mg (1,000 mg Nasal Given 6/27/23 2005)       All details of the evaluation and treatment plan were made clear and additionally all questions and concerns were addressed while under my care  Portions of the record may have been created with voice recognition software  Occasional wrong word or \"sound a like\" substitutions may have occurred due to the inherent limitations of voice recognition software  Read the chart carefully and recognize, using context, where substitutions have occurred  The attending physician physically available and evaluated the above patient alongside myself          Babatunde Alas MD  06/28/23 6795    "

## 2023-06-28 ENCOUNTER — HOSPITAL ENCOUNTER (EMERGENCY)
Facility: HOSPITAL | Age: 28
Discharge: HOME | End: 2023-06-28
Attending: EMERGENCY MEDICINE
Payer: OTHER MISCELLANEOUS

## 2023-06-28 VITALS
SYSTOLIC BLOOD PRESSURE: 157 MMHG | OXYGEN SATURATION: 98 % | HEART RATE: 97 BPM | RESPIRATION RATE: 20 BRPM | TEMPERATURE: 97.8 F | DIASTOLIC BLOOD PRESSURE: 103 MMHG

## 2023-06-28 DIAGNOSIS — S61.209D AVULSION OF FINGERTIP, SUBSEQUENT ENCOUNTER: Primary | ICD-10-CM

## 2023-06-28 PROCEDURE — 90715 TDAP VACCINE 7 YRS/> IM: CPT

## 2023-06-28 RX ADMIN — TETANUS TOXOID, REDUCED DIPHTHERIA TOXOID AND ACELLULAR PERTUSSIS VACCINE, ADSORBED 0.5 ML: 5; 2.5; 8; 8; 2.5 SUSPENSION INTRAMUSCULAR at 01:47

## 2023-06-28 NOTE — DISCHARGE INSTRUCTIONS
Your workup here was not concerning for anything dangerous  Therefore there is no need for you to stay at the hospital for further testing  We feel safe to send you home  You can use Naprosyn for management of your symptoms  You should follow up with a Hand surgeon to assess for resolution of your symptoms and to determine if there is any further evaluation that needs to be performed  Return to the emergency department if you have any symptoms of worsening pain or bbleeding    Thank you for choosing [address] [place] [address] for your care!

## 2023-06-28 NOTE — ED PROVIDER NOTES
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 Taking?    Cholecalciferol (VITAMIN D3) 06289 units CAPS   Yes No   Sig: Take by mouth     Patient not taking: No sig reported   Melatonin 5 MG CAPS   Yes No   Sig: Take by mouth   amphetamine-dextroamphetamine (ADDERALL) 15 MG tablet   Yes No   Sig: Take 25 mg by mouth 2 (two) times a day     escitalopram (LEXAPRO) 5 mg tablet   Yes No   Sig: Take 20 mg by mouth daily    gabapentin (NEURONTIN) 100 mg capsule   Yes No   Sig: Take 100 mg by mouth 2 (two) times a day   gabapentin (NEURONTIN) 300 mg capsule   Yes No   Sig: Take 600 mg by mouth daily at bedtime   medroxyPROGESTERone (DEPO-PROVERA) 150 mg/mL injection   No No   Sig: Inject 1 mL (150 mg total) into a muscle every 3 (three) months   medroxyPROGESTERone (DEPO-PROVERA) 150 mg/mL injection   No No   Sig: Inject 1 mL (150 mg total) into a muscle every 3 (three) months   medroxyPROGESTERone (DEPO-PROVERA) 150 mg/mL injection   No No   Sig: Inject 1 mL (150 mg total) into a muscle every 3 (three) months   naproxen (Naprosyn) 500 mg tablet   No No   Sig: Take 1 tablet (500 mg total) by mouth 2 (two) times a day with meals   perphenazine 8 mg tablet   Yes No   Sig: Take 8 mg by mouth 2 (two) times a day   Patient not taking: No sig reported   potassium chloride (K-DUR,KLOR-CON) 20 mEq tablet   No No Sig: Take 1 tablet by mouth 2 (two) times a day for 5 days   risperiDONE (RisperDAL) 3 mg tablet   Yes No   Sig: Take 3 mg by mouth daily at bedtime   Patient not taking: No sig reported      Facility-Administered Medications: None       Past Medical History:   Diagnosis Date   • Anxiety    • Anxiety    • Papanicolaou smear for cervical cancer screening 07/2018    neg   • 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       Past Surgical History:   Procedure Laterality Date   • APPENDECTOMY  2010       Family History   Problem Relation Age of Onset   • Hypertension Mother    • Fibromyalgia Mother    • Arthritis Mother    • Hypertension Father    • Thyroid cancer Father    • Testicular cancer Father    • No Known Problems Brother    • Colon cancer Maternal Grandmother    • Hypertension Paternal Grandmother    • Lung cancer Paternal Grandfather    • Hypertension Paternal Grandfather    • No Known Problems Brother    • Asthma Brother    • No Known Problems Brother    • Breast cancer Cousin         paternal     I have reviewed and agree with the history as documented  E-Cigarette/Vaping   • E-Cigarette Use Never User      E-Cigarette/Vaping Substances     Social History     Tobacco Use   • Smoking status: Every Day     Packs/day: 0 25     Types: Cigarettes   • Smokeless tobacco: Never   Vaping Use   • Vaping Use: Never used   Substance Use Topics   • Alcohol use: Yes     Comment: rarely   • Drug use: Yes     Types: Marijuana     Comment: daily        Review of Systems   Constitutional: Negative for chills and fever  HENT: Negative for ear pain and sore throat  Eyes: Negative for pain and visual disturbance  Respiratory: Negative for cough, shortness of breath and wheezing  Cardiovascular: Negative for chest pain and palpitations  Gastrointestinal: Negative for abdominal pain, constipation, diarrhea and vomiting     Genitourinary: Negative for dysuria, frequency, hematuria and vaginal bleeding  Musculoskeletal: Negative for arthralgias and back pain  Skin: Positive for wound  Negative for color change and rash  Neurological: Negative for seizures, syncope and headaches  Psychiatric/Behavioral: Negative for agitation and confusion  Physical Exam  ED Triage Vitals [06/28/23 0006]   Temperature Pulse Respirations Blood Pressure SpO2   97 8 °F (36 6 °C) 97 20 (!) 157/103 98 %      Temp Source Heart Rate Source Patient Position - Orthostatic VS BP Location FiO2 (%)   Temporal Monitor Sitting Right arm --      Pain Score       --             Orthostatic Vital Signs  Vitals:    06/28/23 0006   BP: (!) 157/103   Pulse: 97   Patient Position - Orthostatic VS: Sitting       Physical Exam  Vitals and nursing note reviewed  Constitutional:       General: She is not in acute distress  Appearance: Normal appearance  She is well-developed  HENT:      Head: Normocephalic and atraumatic  Right Ear: External ear normal       Left Ear: External ear normal    Cardiovascular:      Rate and Rhythm: Normal rate and regular rhythm  Pulmonary:      Effort: Pulmonary effort is normal  No respiratory distress  Musculoskeletal:         General: Normal range of motion  Hands:       Cervical back: Normal range of motion and neck supple  Skin:     General: Skin is warm and dry  Neurological:      Mental Status: She is alert and oriented to person, place, and time  Mental status is at baseline  Psychiatric:         Mood and Affect: Mood normal          Behavior: Behavior normal          ED Medications  Medications   tetanus-diphtheria-acellular pertussis (BOOSTRIX) IM injection 0 5 mL (0 5 mL Intramuscular Given 6/28/23 0147)       Diagnostic Studies  Results Reviewed     None                 No orders to display         Procedures  Procedures      ED Course             Medical Decision Making  Presents for recheck of wound  The bandage did soak through but was not dripping in blood  "I took the bandage down and redressed the wound  I did not unwrap all the way to the wound as I did not want to disturb any clot  I did update her tetanus vaccination status as she is unsure last vaccine  Patient in agreement with plan and questions were answered  Verbalized understanding of return precautions  Portions or all of this note were generated using voice recognition software  Occasional wrong word or \"sound a like\" substitutions may have occurred due to the inherent limitations of voice recognition software  Please interpret any errors within the intended context of the whole sentence or idea  Risk  Prescription drug management  Disposition  Final diagnoses:   Avulsion of fingertip, subsequent encounter     Time reflects when diagnosis was documented in both MDM as applicable and the Disposition within this note     Time User Action Codes Description Comment    6/28/2023  1:30 AM Ganga Rosa Add [J17 240S] Avulsion of fingertip, subsequent encounter       ED Disposition     ED Disposition   Discharge    Condition   Stable    Date/Time   Wed Jun 28, 2023  1:30 AM    Comment   Lynette Martel discharge to home/self care  Follow-up Information     Follow up With Specialties Details Why Laura Moreno MD [facility] Medicine Schedule an appointment as soon as possible for a visit in 3 days As needed [address]  [address]  [address]  [phone]            Patient's Medications   Discharge Prescriptions    No medications on file     No discharge procedures on file  PDMP Review     None           ED Provider  Attending physically available and evaluated Alban Lopez2  I managed the patient along with the ED Attending      Electronically Signed by         Sherrell Roa MD  06/28/23 0149    "

## 2023-06-28 NOTE — DISCHARGE INSTRUCTIONS
Your tetanus vaccination status was updated  Keep the dressing on for the next 48 hours  Return if the bandage is soaking all the way through

## 2023-06-28 NOTE — ED ATTENDING ATTESTATION
6/28/2023  IBurton MD, saw and evaluated the patient  I have discussed the patient with the resident/non-physician practitioner and agree with the resident's/non-physician practitioner's findings, Plan of Care, and MDM as documented in the resident's/non-physician practitioner's note, except where noted  All available labs and Radiology studies were reviewed  I was present for key portions of any procedure(s) performed by the resident/non-physician practitioner and I was immediately available to provide assistance  At this point I agree with the current assessment done in the Emergency Department  I have conducted an independent evaluation of this patient a history and physical is as follows: This is a 33 y o  old female who presents to the ED for evaluation of wound check  Here a few hours ago for laceration repair to her left third digit  She returned because the dressing had blood through  No other complaints  The dressing was removed and the external dressing did have a light amount of blood on it  Surgicel and nonstick dressing which had been applied directly to the wound and taped in place remained intact  There is no evidence of free-flowing blood coming from this area after observation for 5 minutes  Wound was was rebandaged with clean materials patient was encouraged to keep it in place for 48 to 72 hours and to follow-up for wound check and suture removal as indicated in her prior visit        ED Course         Critical Care Time  Procedures

## 2023-06-29 ENCOUNTER — TELEPHONE (OUTPATIENT)
Dept: OBGYN CLINIC | Facility: HOSPITAL | Age: 28
End: 2023-06-29

## 2023-06-29 NOTE — TELEPHONE ENCOUNTER
Caller: Patient     Doctor: Hand     Reason for call: Needs appt for Avulsion of fingertip    Call back#: [phone]

## 2023-06-29 NOTE — TELEPHONE ENCOUNTER
Caller: Patient    Doctor: Hand/wrist surgeon    Reason for call: Patient calling to schedule appt for finger tip laceration from work  Advised by hand/wrist  that she should verify we are on her work comp panel and then she can call back to schedule       Call back#: [phone]

## 2023-06-30 NOTE — TELEPHONE ENCOUNTER
Caller: Patient- Dianna Rodney     Doctor: Milvia Mason     Reason for call: Patient is calling back in from a left message trans caller over to hand  to get her scheduled with the hand Dr

## 2023-07-03 ENCOUNTER — OFFICE VISIT (OUTPATIENT)
Dept: OBGYN CLINIC | Facility: CLINIC | Age: 28
End: 2023-07-03

## 2023-07-03 VITALS
BODY MASS INDEX: 21.03 KG/M2 | HEIGHT: 69 IN | SYSTOLIC BLOOD PRESSURE: 120 MMHG | WEIGHT: 142 LBS | DIASTOLIC BLOOD PRESSURE: 88 MMHG

## 2023-07-03 DIAGNOSIS — S61.209A AVULSION OF FINGER TIP, INITIAL ENCOUNTER: Primary | ICD-10-CM

## 2023-07-03 PROCEDURE — 99204 OFFICE O/P NEW MOD 45 MIN: CPT | Performed by: ORTHOPAEDIC SURGERY

## 2023-07-03 NOTE — PATIENT INSTRUCTIONS
Soap and water everyday  Small amount of neosporn with a bandaid  Cover for protection  Can take a couple weeks to heal

## 2023-07-03 NOTE — PROGRESS NOTES
ASSESSMENT/PLAN:    Assessment:   Left long fingertip avulsion    Plan: It was discussed with the patient to continue to work on her motion  She was advised to use Ludmila protocol appropriately  She was advised that we will take time for the skin to heal  She will follow-up in 4 weeks for reevaluation of the wound or sooner if any issues    Follow Up:  4 weeks    To Do Next Visit:  Re-evaluation        _____________________________________________________  CHIEF COMPLAINT:  Chief Complaint   Patient presents with   • Left Middle Finger - Laceration     DOI 6/27/23 XR 6/27/23 in ED          SUBJECTIVE:  Anabell Rodriguez is a 33 y.o. female who presents left long finger tip avulsion. She states her injury occurred on 6- while at work. She states that she was cutting up food when she caught part of her fingertip. She did go to the emergency room of which bleeding was controlled and she was placed into a bulky bandage. She did return the following day as well as she had some saturation through the dressing. A dressing change was performed. Today she states she has minimal pain and discomfort. She has been performing dry dressing changes. Part of the Surgicel is still over the tip of the finger. She states occasionally she will get some numbness and tingling into the tip of the finger.     PAST MEDICAL HISTORY:  Past Medical History:   Diagnosis Date   • Anxiety    • Anxiety    • Papanicolaou smear for cervical cancer screening 07/2018    neg   • 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       PAST SURGICAL HISTORY:  Past Surgical History:   Procedure Laterality Date   • APPENDECTOMY  2010       FAMILY HISTORY:  Family History   Problem Relation Age of Onset   • Hypertension Mother    • Fibromyalgia Mother    • Arthritis Mother    • Hypertension Father    • Thyroid cancer Father    • Testicular cancer Father    • No Known Problems Brother    • Colon cancer Maternal Grandmother    • Hypertension Paternal Grandmother    • Lung cancer Paternal Grandfather    • Hypertension Paternal Grandfather    • No Known Problems Brother    • Asthma Brother    • No Known Problems Brother    • Breast cancer Cousin         paternal       SOCIAL HISTORY:  Social History     Tobacco Use   • Smoking status: Every Day     Packs/day: 0.25     Types: Cigarettes   • Smokeless tobacco: Never   Vaping Use   • Vaping Use: Never used   Substance Use Topics   • Alcohol use: Yes     Comment: rarely   • Drug use: Yes     Types: Marijuana     Comment: daily       MEDICATIONS:    Current Outpatient Medications:   •  naproxen (Naprosyn) 500 mg tablet, Take 1 tablet (500 mg total) by mouth 2 (two) times a day with meals, Disp: 30 tablet, Rfl: 0  •  Cholecalciferol (VITAMIN D3) 93679 units CAPS, Take by mouth   (Patient not taking: No sig reported), Disp: , Rfl:   •  perphenazine 8 mg tablet, Take 8 mg by mouth 2 (two) times a day (Patient not taking: No sig reported), Disp: , Rfl:     ALLERGIES:  No Known Allergies    REVIEW OF SYSTEMS:  Pertinent items are noted in HPI. A comprehensive review of systems was negative.     LABS:  HgA1c: No results found for: "HGBA1C"  BMP:   Lab Results   Component Value Date    GLUCOSE 97 11/30/2017    CALCIUM 9.4 11/30/2017    K 3.5 11/30/2017    CO2 25 11/30/2017     (H) 11/30/2017    BUN 14 11/30/2017    CREATININE 0.90 11/30/2017       _____________________________________________________  PHYSICAL EXAMINATION:  Vital signs: /88   Ht 5' 9" (1.753 m)   Wt 64.4 kg (142 lb)   BMI 20.97 kg/m²   General: well developed and well nourished, alert, oriented times 3 and appears comfortable  Psychiatric: Normal  HEENT: Trachea Midline, No torticollis  Cardiovascular: No discernable arrhythmia  Pulmonary: No wheezing or stridor  Abdomen: No rebound or guarding  Extremities: No peripheral edema  Skin: No masses, erythema, lacerations, fluctation, ulcerations  Neurovascular: Sensation Intact to the Median, Ulnar, Radial Nerve, Motor Intact to the Median, Ulnar, Radial Nerve and Pulses Intact    MUSCULOSKELETAL EXAMINATION:  Left long finger  Partial avulsion of skin noted at the tip of the digit, no erythema or ecchymosis noted  Surgicel is still appreciated at the tip of the finger  Partial injury to the nail is appreciated  She is able to flex and extend the PIP and DIP joint without discomfort  Sensation is intact to light touch    _____________________________________________________  STUDIES REVIEWED:  Images were reviewed in PACS by Dr. Bean Quevedo and demonstrate: Left long finger demonstrate no acute fractures or dislocations.       PROCEDURES PERFORMED:  Procedures  No Procedures performed today    Scribe Attestation    I,:   am acting as a scribe while in the presence of the attending physician.:       I,:   personally performed the services described in this documentation    as scribed in my presence.: